# Patient Record
Sex: MALE | Race: WHITE | Employment: OTHER | URBAN - METROPOLITAN AREA
[De-identification: names, ages, dates, MRNs, and addresses within clinical notes are randomized per-mention and may not be internally consistent; named-entity substitution may affect disease eponyms.]

---

## 2017-01-04 ENCOUNTER — OP HISTORICAL/CONVERTED ENCOUNTER (OUTPATIENT)
Dept: OTHER | Age: 82
End: 2017-01-04

## 2017-02-15 ENCOUNTER — OP HISTORICAL/CONVERTED ENCOUNTER (OUTPATIENT)
Dept: OTHER | Age: 82
End: 2017-02-15

## 2017-04-12 ENCOUNTER — OP HISTORICAL/CONVERTED ENCOUNTER (OUTPATIENT)
Dept: OTHER | Age: 82
End: 2017-04-12

## 2017-05-17 ENCOUNTER — OP HISTORICAL/CONVERTED ENCOUNTER (OUTPATIENT)
Dept: OTHER | Age: 82
End: 2017-05-17

## 2017-05-24 ENCOUNTER — OP HISTORICAL/CONVERTED ENCOUNTER (OUTPATIENT)
Dept: OTHER | Age: 82
End: 2017-05-24

## 2017-08-23 ENCOUNTER — OP HISTORICAL/CONVERTED ENCOUNTER (OUTPATIENT)
Dept: OTHER | Age: 82
End: 2017-08-23

## 2017-08-29 ENCOUNTER — OP HISTORICAL/CONVERTED ENCOUNTER (OUTPATIENT)
Dept: OTHER | Age: 82
End: 2017-08-29

## 2017-11-08 ENCOUNTER — OP HISTORICAL/CONVERTED ENCOUNTER (OUTPATIENT)
Dept: OTHER | Age: 82
End: 2017-11-08

## 2018-02-12 ENCOUNTER — OP HISTORICAL/CONVERTED ENCOUNTER (OUTPATIENT)
Dept: OTHER | Age: 83
End: 2018-02-12

## 2018-03-02 ENCOUNTER — OP HISTORICAL/CONVERTED ENCOUNTER (OUTPATIENT)
Dept: OTHER | Age: 83
End: 2018-03-02

## 2018-03-07 ENCOUNTER — OP HISTORICAL/CONVERTED ENCOUNTER (OUTPATIENT)
Dept: OTHER | Age: 83
End: 2018-03-07

## 2018-04-06 ENCOUNTER — OP HISTORICAL/CONVERTED ENCOUNTER (OUTPATIENT)
Dept: OTHER | Age: 83
End: 2018-04-06

## 2018-04-24 ENCOUNTER — OP HISTORICAL/CONVERTED ENCOUNTER (OUTPATIENT)
Dept: OTHER | Age: 83
End: 2018-04-24

## 2018-05-17 ENCOUNTER — OP HISTORICAL/CONVERTED ENCOUNTER (OUTPATIENT)
Dept: OTHER | Age: 83
End: 2018-05-17

## 2018-05-21 ENCOUNTER — OP HISTORICAL/CONVERTED ENCOUNTER (OUTPATIENT)
Dept: OTHER | Age: 83
End: 2018-05-21

## 2018-06-28 ENCOUNTER — OP HISTORICAL/CONVERTED ENCOUNTER (OUTPATIENT)
Dept: OTHER | Age: 83
End: 2018-06-28

## 2018-07-02 ENCOUNTER — OP HISTORICAL/CONVERTED ENCOUNTER (OUTPATIENT)
Dept: OTHER | Age: 83
End: 2018-07-02

## 2018-08-09 ENCOUNTER — OP HISTORICAL/CONVERTED ENCOUNTER (OUTPATIENT)
Dept: OTHER | Age: 83
End: 2018-08-09

## 2018-08-16 ENCOUNTER — OP HISTORICAL/CONVERTED ENCOUNTER (OUTPATIENT)
Dept: OTHER | Age: 83
End: 2018-08-16

## 2018-09-04 ENCOUNTER — OP HISTORICAL/CONVERTED ENCOUNTER (OUTPATIENT)
Dept: OTHER | Age: 83
End: 2018-09-04

## 2018-10-01 ENCOUNTER — OP HISTORICAL/CONVERTED ENCOUNTER (OUTPATIENT)
Dept: OTHER | Age: 83
End: 2018-10-01

## 2018-10-09 ENCOUNTER — OP HISTORICAL/CONVERTED ENCOUNTER (OUTPATIENT)
Dept: OTHER | Age: 83
End: 2018-10-09

## 2018-10-15 ENCOUNTER — OP HISTORICAL/CONVERTED ENCOUNTER (OUTPATIENT)
Dept: OTHER | Age: 83
End: 2018-10-15

## 2018-11-01 ENCOUNTER — OP HISTORICAL/CONVERTED ENCOUNTER (OUTPATIENT)
Dept: OTHER | Age: 83
End: 2018-11-01

## 2018-11-14 ENCOUNTER — OP HISTORICAL/CONVERTED ENCOUNTER (OUTPATIENT)
Dept: OTHER | Age: 83
End: 2018-11-14

## 2018-11-20 ENCOUNTER — OP HISTORICAL/CONVERTED ENCOUNTER (OUTPATIENT)
Dept: OTHER | Age: 83
End: 2018-11-20

## 2018-11-21 ENCOUNTER — OP HISTORICAL/CONVERTED ENCOUNTER (OUTPATIENT)
Dept: OTHER | Age: 83
End: 2018-11-21

## 2020-01-03 ENCOUNTER — OP HISTORICAL/CONVERTED ENCOUNTER (OUTPATIENT)
Dept: OTHER | Age: 85
End: 2020-01-03

## 2020-01-03 LAB — CREATININE, EXTERNAL: 1.35

## 2020-01-06 ENCOUNTER — OP HISTORICAL/CONVERTED ENCOUNTER (OUTPATIENT)
Dept: OTHER | Age: 85
End: 2020-01-06

## 2020-06-12 LAB — PSA, EXTERNAL: 0.1

## 2020-07-15 VITALS
HEIGHT: 67 IN | DIASTOLIC BLOOD PRESSURE: 77 MMHG | TEMPERATURE: 98.2 F | BODY MASS INDEX: 30.92 KG/M2 | WEIGHT: 197 LBS | SYSTOLIC BLOOD PRESSURE: 128 MMHG

## 2020-07-15 PROBLEM — R82.998 LEUKOCYTES IN URINE: Status: ACTIVE | Noted: 2020-07-15

## 2020-07-15 PROBLEM — N41.1 CHRONIC PROSTATITIS: Status: ACTIVE | Noted: 2020-07-15

## 2020-07-15 PROBLEM — R33.9 INCOMPLETE EMPTYING OF BLADDER: Status: ACTIVE | Noted: 2020-07-15

## 2020-07-15 PROBLEM — N13.8 ENLARGED PROSTATE WITH URINARY OBSTRUCTION: Status: ACTIVE | Noted: 2020-07-15

## 2020-07-15 PROBLEM — N40.1 ENLARGED PROSTATE WITH URINARY OBSTRUCTION: Status: ACTIVE | Noted: 2020-07-15

## 2020-07-15 PROBLEM — R35.1 NOCTURIA: Status: ACTIVE | Noted: 2020-07-15

## 2020-07-15 PROBLEM — N32.0 BLADDER NECK OBSTRUCTION: Status: ACTIVE | Noted: 2020-07-15

## 2020-07-15 PROBLEM — R31.0 FRANK HEMATURIA: Status: ACTIVE | Noted: 2020-07-15

## 2020-07-15 PROBLEM — N45.1 CHRONIC EPIDIDYMITIS: Status: ACTIVE | Noted: 2020-07-15

## 2020-07-15 PROBLEM — R35.0 INCREASED FREQUENCY OF URINATION: Status: ACTIVE | Noted: 2020-07-15

## 2020-07-15 PROBLEM — R39.16 MUST STRAIN TO PASS URINE: Status: ACTIVE | Noted: 2020-07-15

## 2020-07-15 PROBLEM — R31.29 MICROSCOPIC HEMATURIA: Status: ACTIVE | Noted: 2020-07-15

## 2020-07-15 RX ORDER — CANDESARTAN 8 MG/1
TABLET ORAL DAILY
COMMUNITY
End: 2021-12-11

## 2020-07-15 RX ORDER — FUROSEMIDE 40 MG/1
TABLET ORAL DAILY
COMMUNITY

## 2020-07-15 RX ORDER — UREA 10 %
LOTION (ML) TOPICAL
COMMUNITY

## 2020-07-15 RX ORDER — TAMSULOSIN HYDROCHLORIDE 0.4 MG/1
0.4 CAPSULE ORAL DAILY
COMMUNITY

## 2020-07-15 RX ORDER — TRAZODONE HYDROCHLORIDE 50 MG/1
TABLET ORAL
COMMUNITY

## 2020-07-15 RX ORDER — LEUPROLIDE ACETATE 45 MG
45 KIT INTRAMUSCULAR ONCE
COMMUNITY

## 2020-07-15 RX ORDER — PRAVASTATIN SODIUM 40 MG/1
40 TABLET ORAL
COMMUNITY

## 2020-07-15 RX ORDER — CARVEDILOL 6.25 MG/1
TABLET ORAL 2 TIMES DAILY WITH MEALS
COMMUNITY
End: 2021-12-11

## 2020-07-15 RX ORDER — DOCUSATE SODIUM 100 MG/1
100 CAPSULE, LIQUID FILLED ORAL 2 TIMES DAILY
COMMUNITY

## 2020-07-15 RX ORDER — MELATONIN
DAILY
COMMUNITY

## 2020-07-15 RX ORDER — LISINOPRIL 5 MG/1
TABLET ORAL DAILY
COMMUNITY

## 2020-07-15 RX ORDER — TRAMADOL HYDROCHLORIDE 50 MG/1
50 TABLET ORAL
COMMUNITY

## 2020-07-15 RX ORDER — ASPIRIN 81 MG/1
TABLET ORAL DAILY
COMMUNITY

## 2020-07-15 RX ORDER — BISMUTH SUBSALICYLATE 262 MG
1 TABLET,CHEWABLE ORAL DAILY
COMMUNITY

## 2020-07-15 RX ORDER — ALFUZOSIN HYDROCHLORIDE 10 MG/1
TABLET, EXTENDED RELEASE ORAL DAILY
COMMUNITY
End: 2021-12-11

## 2020-07-15 RX ORDER — DUTASTERIDE 0.5 MG/1
0.5 CAPSULE, LIQUID FILLED ORAL DAILY
COMMUNITY

## 2020-07-15 RX ORDER — FINASTERIDE 5 MG/1
5 TABLET, FILM COATED ORAL DAILY
COMMUNITY

## 2020-07-15 RX ORDER — EPLERENONE 25 MG/1
TABLET, FILM COATED ORAL DAILY
COMMUNITY
End: 2021-12-11

## 2020-07-21 ENCOUNTER — OFFICE VISIT (OUTPATIENT)
Dept: UROLOGY | Age: 85
End: 2020-07-21
Payer: MEDICARE

## 2020-07-21 DIAGNOSIS — C61 MALIGNANT NEOPLASM OF PROSTATE (HCC): Primary | ICD-10-CM

## 2020-07-21 PROCEDURE — 96402 CHEMO HORMON ANTINEOPL SQ/IM: CPT

## 2020-07-21 RX ORDER — MEGESTROL ACETATE 40 MG/1
40 TABLET ORAL DAILY
Qty: 60 TAB | Refills: 3 | Status: SHIPPED | OUTPATIENT
Start: 2020-07-21

## 2020-07-22 VITALS
DIASTOLIC BLOOD PRESSURE: 77 MMHG | TEMPERATURE: 98.2 F | SYSTOLIC BLOOD PRESSURE: 128 MMHG | BODY MASS INDEX: 30.92 KG/M2 | WEIGHT: 197 LBS | HEIGHT: 67 IN

## 2020-07-22 PROBLEM — R39.9 LOWER URINARY TRACT SYMPTOMS: Status: ACTIVE | Noted: 2020-07-22

## 2020-07-22 PROBLEM — R97.20 RAISED PROSTATE SPECIFIC ANTIGEN: Status: ACTIVE | Noted: 2020-07-22

## 2020-07-22 PROBLEM — N13.9 URINARY (TRACT) OBSTRUCTION: Status: ACTIVE | Noted: 2020-07-22

## 2020-07-22 PROBLEM — R39.198 SLOWING OF URINARY STREAM: Status: ACTIVE | Noted: 2020-07-22

## 2020-07-22 PROBLEM — N45.3 ORCHITIS AND EPIDIDYMITIS: Status: ACTIVE | Noted: 2020-07-22

## 2020-07-22 RX ORDER — ACETAMINOPHEN 500 MG
TABLET ORAL
COMMUNITY

## 2020-07-22 RX ORDER — SENNOSIDES 25 MG/1
TABLET, FILM COATED ORAL
COMMUNITY

## 2020-07-22 RX ORDER — DORZOLAMIDE HCL 20 MG/ML
2 SOLUTION/ DROPS OPHTHALMIC 3 TIMES DAILY
COMMUNITY

## 2020-07-22 RX ORDER — SERTRALINE HYDROCHLORIDE 50 MG/1
TABLET, FILM COATED ORAL DAILY
COMMUNITY

## 2020-09-10 ENCOUNTER — OFFICE VISIT (OUTPATIENT)
Dept: UROLOGY | Age: 85
End: 2020-09-10
Payer: MEDICARE

## 2020-09-10 VITALS
HEIGHT: 68 IN | WEIGHT: 192 LBS | DIASTOLIC BLOOD PRESSURE: 70 MMHG | TEMPERATURE: 97.9 F | SYSTOLIC BLOOD PRESSURE: 112 MMHG | BODY MASS INDEX: 29.1 KG/M2

## 2020-09-10 DIAGNOSIS — C61 MALIGNANT NEOPLASM OF PROSTATE (HCC): Primary | ICD-10-CM

## 2020-09-10 LAB
BILIRUB UR QL STRIP: NEGATIVE
GLUCOSE UR-MCNC: NEGATIVE MG/DL
KETONES P FAST UR STRIP-MCNC: NORMAL MG/DL
PH UR STRIP: 7 [PH] (ref 4.6–8)
PROT UR QL STRIP: NORMAL
SP GR UR STRIP: 1.02 (ref 1–1.03)
UA UROBILINOGEN AMB POC: NORMAL (ref 0.2–1)
URINALYSIS CLARITY POC: CLEAR
URINALYSIS COLOR POC: YELLOW
URINE BLOOD POC: NORMAL
URINE LEUKOCYTES POC: NEGATIVE
URINE NITRITES POC: NEGATIVE

## 2020-09-10 PROCEDURE — 81003 URINALYSIS AUTO W/O SCOPE: CPT | Performed by: UROLOGY

## 2020-09-10 PROCEDURE — 99214 OFFICE O/P EST MOD 30 MIN: CPT | Performed by: UROLOGY

## 2020-09-10 PROCEDURE — G8427 DOCREV CUR MEDS BY ELIG CLIN: HCPCS | Performed by: UROLOGY

## 2020-09-10 RX ORDER — MEGESTROL ACETATE 40 MG/1
40 TABLET ORAL 2 TIMES DAILY
Qty: 180 TAB | Refills: 3 | Status: SHIPPED | OUTPATIENT
Start: 2020-09-10 | End: 2020-12-09

## 2020-09-10 RX ORDER — TAMSULOSIN HYDROCHLORIDE 0.4 MG/1
0.8 CAPSULE ORAL
Qty: 180 CAP | Refills: 3 | Status: SHIPPED | OUTPATIENT
Start: 2020-09-10 | End: 2020-12-09

## 2020-09-10 RX ORDER — ALFUZOSIN HYDROCHLORIDE 10 MG/1
10 TABLET, EXTENDED RELEASE ORAL
Qty: 90 TAB | Refills: 3 | Status: SHIPPED | OUTPATIENT
Start: 2020-09-10 | End: 2020-12-09

## 2020-09-10 NOTE — PROGRESS NOTES
HPI ROS PE NOTE          History of Present Illness   Chief complaint: Carcinoma of the prostate,followup  Rose Mary Marx is a 80 y.o. 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 June ablation which was begun to accompany radiation is continued because of high risk disease despite the fact that was only 1+ biopsy; patient has been complicated by radiation cystitis causing gross hematuria, patient had cystoscopy and retrograde pyelograms performed as recently as January of this year. Previous cystoscopies in 2018 showed the same findings. Had a recent episode of gross hematuria which is cleared. Patient also has rather marked adverse voiding symptoms which have which has been only partially solved with treatment with multiple medications. Takes both alfuzosin after supper and tamsulosin after breakfast among the alpha blockers, tamsulosin being 0.8 mg. He has been on finasteride and dutasteride as well. Bethanechol chloride has been prescribed but was not of great benefit. 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 Patient has a high degree of anxiety about his prostate cancer.    Past Medical History:   Diagnosis Date    A-fib ([address]     Aneurysm ([address]     Burning with urination     Cysto & BRP    CAD (coronary artery disease)     A fib    Cancer (HCC)     prostate    Depression     Dizziness     Dizziness     GERD (gastroesophageal reflux disease)     Headache     Hypercholesterolemia     Hypertension     Hypotension     Pulmonary disease     Pulmonary disease     Sleep apnea     Thyroid disease       Past Surgical History:   Procedure Laterality Date    CARDIAC SURG PROCEDURE UNLIST      Catheterization, Defibrillator    HX AFIB ABLATION      HX CHOLECYSTECTOMY      HX COLONOSCOPY      2018    HX UROLOGICAL       Family History   Problem Relation Age of Onset    Heart Disease Father     Heart Attack Father     Prostate Cancer Brother     Colon Cancer Brother     Cancer Brother       Social History     Tobacco Use    Smoking status: Former Smoker    Smokeless tobacco: Never Used   Substance Use Topics    Alcohol use: Never     Frequency: Never       Prior to Admission medications    Medication Sig Start Date End Date Taking? Authorizing Provider   alfuzosin SR (UROXATRAL) 10 mg SR tablet Take 1 Tab by mouth daily (after dinner) for 90 days. 9/10/20 12/9/20 Yes Kerwin Bowers MD   megestroL (MEGACE) 40 mg tablet Take 1 Tab by mouth two (2) times a day for 90 days. 9/10/20 12/9/20 Yes Kerwin Bowers MD   folic acid/multivit-min/lutein (CENTRUM SILVER PO) Take  by mouth. Yes Provider, Historical   dorzolamide (TRUSOPT) 2 % ophthalmic solution Administer 2 Drops to both eyes three (3) times daily. Yes Provider, Historical   lidocaine 5 % topical cream Apply  to affected area two (2) times daily as needed. Yes Provider, Historical   sertraline (ZOLOFT) 50 mg tablet Take  by mouth daily. Yes Provider, Historical   acetaminophen (TYLENOL) 500 mg tablet Take  by mouth every six (6) hours as needed for Pain. Yes Provider, Historical   megestroL (MEGACE) 40 mg tablet Take 1 Tab by mouth daily. 7/21/20  Yes Kerwin Bowers MD   alfuzosin SR (UROXATRAL) 10 mg SR tablet Take  by mouth daily. Yes Provider, Historical   candesartan (ATACAND) 8 mg tablet Take  by mouth daily. Yes Provider, Historical   carvediloL (COREG) 6.25 mg tablet Take  by mouth two (2) times daily (with meals). Yes Provider, Historical   multivitamin (ONE A DAY) tablet Take 1 Tab by mouth daily. Yes Provider, Historical   docusate sodium (COLACE) 100 mg capsule Take 100 mg by mouth two (2) times a day. Yes Provider, Historical   dutasteride (AVODART) 0.5 mg capsule Take 0.5 mg by mouth daily.    Yes Provider, Historical apixaban (Eliquis) 5 mg tablet Take 5 mg by mouth two (2) times a day. Yes Provider, Historical   eplerenone (INSPRA) 25 mg tablet Take  by mouth daily. Yes Provider, Historical   finasteride (PROSCAR) 5 mg tablet Take 5 mg by mouth daily. Yes Provider, Historical   furosemide (LASIX) 40 mg tablet Take  by mouth daily. Yes Provider, Historical   lisinopriL (PRINIVIL, ZESTRIL) 5 mg tablet Take  by mouth daily. Yes Provider, Historical   leuprolide depot (Lupron Depot, 6 Month,) 45 mg injection 45 mg by IntraMUSCular route once. Yes Provider, Historical   melatonin 1 mg tablet Take  by mouth. Yes Provider, Historical   pravastatin (PRAVACHOL) 40 mg tablet Take 40 mg by mouth nightly. Yes Provider, Historical   tamsulosin (FLOMAX) 0.4 mg capsule Take 0.4 mg by mouth daily. Yes Provider, Historical   traMADoL (ULTRAM) 50 mg tablet Take 50 mg by mouth every six (6) hours as needed for Pain. Yes Provider, Historical   traZODone (DESYREL) 50 mg tablet Take  by mouth nightly. Yes Provider, Historical   cholecalciferol (Vitamin D3) (1000 Units /25 mcg) tablet Take  by mouth daily. Yes Provider, Historical   aspirin delayed-release 81 mg tablet Take  by mouth daily. Provider, Historical     No Known Allergies     Review of Systems:  Constitutional: negative  Respiratory: negative  Cardiovascular: negative  Gastrointestinal: positive for constipation  Genitourinary:positive for frequency, nocturia, decreased stream and Urgency incomplete emptying straining to urinate  Musculoskeletal:positive for arthralgias, stiff joints and back pain  Neurological: negative  Behavioral/Psychiatric: positive for anxiety     Physical Exam     Physical Exam:   Visit Vitals  /70 (BP 1 Location: Left arm, BP Patient Position: Sitting)   Temp 97.9 °F (36.6 °C) (Oral)   Ht 5' 8\" (1.727 m)   Wt 192 lb (87.1 kg)   BMI 29.19 kg/m²     General:  Alert, cooperative, no distress, appears stated age.    Head: Normocephalic, without obvious abnormality, atraumatic. Eyes:     Ears:     Nose: Nares normal. Septum midline. Mucosa normal. No drainage or sinus tenderness. Throat: Lips, mucosa, and tongue normal. Teeth and gums normal.   Neck: Supple, symmetrical, trachea midline, no adenopathy, thyroid: no enlargement/tenderness/nodules, no carotid bruit and no JVD. Back:   Symmetric, no curvature. ROM normal. No CVA tenderness. Lungs:   Clear to auscultation bilaterally. Chest wall:  No tenderness or deformity. Heart:  Regular rate and rhythm, S1, S2 normal, no murmur, click, rub or gallop. Abdomen:   Soft, non-tender. Bowel sounds normal. No masses,  No organomegaly. Genitalia:  Normal male without lesion, discharge or tenderness. Rectal:  Normal tone, 28 gm prostate, no masses or tenderness     Extremities: Extremities normal, atraumatic, no cyanosis or edema. Pulses: 2+ and symmetric all extremities. Skin: Skin color, texture, turgor normal. No rashes or lesions   Lymph nodes: Cervical, supraclavicular, and axillary nodes normal.   Neurologic: Normal strength, sensation and reflexes throughout. Data Review:   Recent Results (from the past 50 hour(s))   AMB POC URINALYSIS DIP STICK AUTO W/O MICRO    Collection Time: 09/10/20  2:26 PM   Result Value Ref Range    Color (UA POC) Yellow     Clarity (UA POC) Clear     Glucose (UA POC) Negative Negative    Bilirubin (UA POC) Negative Negative    Ketones (UA POC) Trace Negative    Specific gravity (UA POC) 1.020 1.001 - 1.035    Blood (UA POC) 3+ Negative    pH (UA POC) 7.0 4.6 - 8.0    Protein (UA POC) Trace Negative    Urobilinogen (UA POC) 2 mg/dL 0.2 - 1    Nitrites (UA POC) Negative Negative    Leukocyte esterase (UA POC) Negative Negative     No results for input(s): 48 in the last 72 hours.       Assessment and Plan:   High risk carcinoma of the prostate, [place] 8, perform PSA test in 1 month which is 3 months after last leuprolide injection in July of this year; also test testosterone level; follow-up visit with repeat PSA and testosterone in January 2021 to assess for need for another leuprolide injection to keep testosterone low; patient advised that he can discontinue finasteride and dutasteride since leuprolide accomplishes the same end by lowering testosterone  Hot flash menopausal flushing reaction to lowered testosterone from leuprolide treated with refill of megestrol  40 mg twice daily; alfuzosin also refilled along with tamsulosin. Continue to follow for gross hematuria secondary to radiation cystitis  Extended visit, 25 minutes face to face, review of records, review of labs, refill of medications, physical examination and extensive counseling regarding repeat hematuria radiation cystitis and current stability of prostate cancer with treatments available for significant bladder neck obstruction, an ongoing problem for this patient. Mr. Lucas Wisdom has a reminder for a \"due or due soon\" health maintenance. I have asked that he contact his primary care provider for follow-up on this health maintenance. Elvis Douglas M.D.  9/10/2020

## 2020-09-10 NOTE — LETTER
9/10/20 Patient: Zaida Lizarraga YOB: 1930 Date of Visit: 9/10/2020 Gene Gil MD 
[address] [place] [address],[address] [id number] VIA Facsimile: [phone] Dear Gene Gil MD, Thank you for referring Mr. Zaida Lizarraga to [address] [place] [address] [place] for evaluation. My notes for this consultation are attached. If you have questions, please do not hesitate to call me. I look forward to following your patient along with you. Sincerely, Sheri Dillon MD

## 2020-09-18 DIAGNOSIS — C61 MALIGNANT NEOPLASM OF PROSTATE (HCC): Primary | ICD-10-CM

## 2020-09-18 DIAGNOSIS — N32.0 BLADDER NECK OBSTRUCTION: ICD-10-CM

## 2020-09-18 DIAGNOSIS — N40.1 ENLARGED PROSTATE WITH URINARY OBSTRUCTION: ICD-10-CM

## 2020-09-18 DIAGNOSIS — N13.8 ENLARGED PROSTATE WITH URINARY OBSTRUCTION: ICD-10-CM

## 2020-09-18 RX ORDER — TAMSULOSIN HYDROCHLORIDE 0.4 MG/1
0.4 CAPSULE ORAL DAILY
Qty: 90 CAP | Refills: 3 | Status: CANCELLED | OUTPATIENT
Start: 2020-09-18

## 2020-09-18 RX ORDER — ALFUZOSIN HYDROCHLORIDE 10 MG/1
10 TABLET, EXTENDED RELEASE ORAL DAILY
Qty: 90 TAB | Refills: 3 | Status: CANCELLED | OUTPATIENT
Start: 2020-09-18

## 2020-09-18 RX ORDER — MEGESTROL ACETATE 40 MG/1
40 TABLET ORAL DAILY
Qty: 90 TAB | Refills: 3 | Status: CANCELLED | OUTPATIENT
Start: 2020-09-18

## 2020-09-21 ENCOUNTER — TELEPHONE (OUTPATIENT)
Dept: UROLOGY | Age: 85
End: 2020-09-21

## 2020-09-21 DIAGNOSIS — N40.1 ENLARGED PROSTATE WITH URINARY OBSTRUCTION: ICD-10-CM

## 2020-09-21 DIAGNOSIS — C61 MALIGNANT NEOPLASM OF PROSTATE (HCC): Primary | ICD-10-CM

## 2020-09-21 DIAGNOSIS — N13.8 ENLARGED PROSTATE WITH URINARY OBSTRUCTION: ICD-10-CM

## 2020-09-23 DIAGNOSIS — N13.8 ENLARGED PROSTATE WITH URINARY OBSTRUCTION: Primary | ICD-10-CM

## 2020-09-23 DIAGNOSIS — N40.1 ENLARGED PROSTATE WITH URINARY OBSTRUCTION: Primary | ICD-10-CM

## 2020-09-23 DIAGNOSIS — C61 MALIGNANT NEOPLASM OF PROSTATE (HCC): ICD-10-CM

## 2020-09-23 RX ORDER — MEGESTROL ACETATE 40 MG/1
40 TABLET ORAL 2 TIMES DAILY
Qty: 180 TAB | Refills: 3 | Status: CANCELLED | OUTPATIENT
Start: 2020-09-23 | End: 2020-12-22

## 2020-09-23 RX ORDER — TAMSULOSIN HYDROCHLORIDE 0.4 MG/1
0.8 CAPSULE ORAL
Qty: 180 CAP | Refills: 3 | Status: CANCELLED | OUTPATIENT
Start: 2020-09-23 | End: 2020-12-22

## 2020-09-23 RX ORDER — ALFUZOSIN HYDROCHLORIDE 10 MG/1
10 TABLET, EXTENDED RELEASE ORAL
Qty: 90 TAB | Refills: 3 | Status: CANCELLED | OUTPATIENT
Start: 2020-09-23 | End: 2020-12-22

## 2020-09-24 RX ORDER — ALFUZOSIN HYDROCHLORIDE 10 MG/1
10 TABLET, EXTENDED RELEASE ORAL DAILY
Qty: 90 TAB | Refills: 3 | Status: CANCELLED | OUTPATIENT
Start: 2020-09-24

## 2020-09-24 RX ORDER — TAMSULOSIN HYDROCHLORIDE 0.4 MG/1
0.8 CAPSULE ORAL
Qty: 180 CAP | Refills: 3 | Status: CANCELLED | OUTPATIENT
Start: 2020-09-24

## 2020-09-24 RX ORDER — MEGESTROL ACETATE 40 MG/1
40 TABLET ORAL 2 TIMES DAILY
Qty: 180 TAB | Refills: 3 | Status: CANCELLED | OUTPATIENT
Start: 2020-09-24

## 2020-10-06 LAB
PSA SERPL-MCNC: <0.1 NG/ML (ref 0–4)
TESTOST FREE SERPL-MCNC: 1.8 PG/ML (ref 6.6–18.1)
TESTOST SERPL-MCNC: 7 NG/DL (ref 264–916)

## 2020-10-10 DIAGNOSIS — C61 MALIGNANT NEOPLASM OF PROSTATE (HCC): ICD-10-CM

## 2021-01-10 DIAGNOSIS — C61 MALIGNANT NEOPLASM OF PROSTATE (HCC): ICD-10-CM

## 2021-01-25 ENCOUNTER — TELEPHONE (OUTPATIENT)
Dept: UROLOGY | Age: 86
End: 2021-01-25

## 2021-01-25 DIAGNOSIS — N41.1 CHRONIC PROSTATITIS: ICD-10-CM

## 2021-01-25 DIAGNOSIS — N13.8 ENLARGED PROSTATE WITH URINARY OBSTRUCTION: Primary | ICD-10-CM

## 2021-01-25 DIAGNOSIS — N40.1 ENLARGED PROSTATE WITH URINARY OBSTRUCTION: Primary | ICD-10-CM

## 2021-01-25 DIAGNOSIS — R97.20 RAISED PROSTATE SPECIFIC ANTIGEN: ICD-10-CM

## 2021-01-25 NOTE — TELEPHONE ENCOUNTER
Spoke with Mr. Antonieta Martinez and he said that he no longer see's Dr. Anastasiia Cavazos anymore and that all his records have been transferred to [facility].

## 2021-06-23 ENCOUNTER — HOSPITAL ENCOUNTER (OUTPATIENT)
Dept: RADIATION THERAPY | Age: 86
Discharge: HOME | End: 2021-06-23

## 2021-12-09 ENCOUNTER — APPOINTMENT (OUTPATIENT)
Dept: GENERAL RADIOLOGY | Age: 86
End: 2021-12-09
Attending: STUDENT IN AN ORGANIZED HEALTH CARE EDUCATION/TRAINING PROGRAM
Payer: MEDICARE

## 2021-12-09 ENCOUNTER — APPOINTMENT (OUTPATIENT)
Dept: CT IMAGING | Age: 86
End: 2021-12-09
Attending: EMERGENCY MEDICINE
Payer: MEDICARE

## 2021-12-09 ENCOUNTER — HOSPITAL ENCOUNTER (OUTPATIENT)
Age: 86
Setting detail: OBSERVATION
Discharge: HOME | End: 2021-12-11
Attending: EMERGENCY MEDICINE | Admitting: INTERNAL MEDICINE
Payer: MEDICARE

## 2021-12-09 DIAGNOSIS — R55 NEAR SYNCOPE: Primary | ICD-10-CM

## 2021-12-09 LAB
ALBUMIN SERPL-MCNC: 3 G/DL (ref 3.5–5)
ALBUMIN/GLOB SERPL: 1.3 {RATIO} (ref 1.1–2.2)
ALP SERPL-CCNC: 55 U/L (ref 45–117)
ALT SERPL-CCNC: 20 U/L (ref 12–78)
ANION GAP SERPL CALC-SCNC: 3 MMOL/L (ref 5–15)
APPEARANCE UR: CLEAR
AST SERPL W P-5'-P-CCNC: 18 U/L (ref 15–37)
BACTERIA URNS QL MICRO: NEGATIVE /HPF
BASOPHILS # BLD: 0 K/UL (ref 0–0.1)
BASOPHILS NFR BLD: 0 % (ref 0–1)
BILIRUB SERPL-MCNC: 0.5 MG/DL (ref 0.2–1)
BILIRUB UR QL: NEGATIVE
BUN SERPL-MCNC: 21 MG/DL (ref 6–20)
BUN/CREAT SERPL: 16 (ref 12–20)
CA-I BLD-MCNC: 8.2 MG/DL (ref 8.5–10.1)
CHLORIDE SERPL-SCNC: 113 MMOL/L (ref 97–108)
CO2 SERPL-SCNC: 27 MMOL/L (ref 21–32)
COLOR UR: ABNORMAL
CREAT SERPL-MCNC: 1.29 MG/DL (ref 0.7–1.3)
DIFFERENTIAL METHOD BLD: ABNORMAL
EOSINOPHIL # BLD: 0.1 K/UL (ref 0–0.4)
EOSINOPHIL NFR BLD: 1 % (ref 0–7)
ERYTHROCYTE [DISTWIDTH] IN BLOOD BY AUTOMATED COUNT: 12.2 % (ref 11.5–14.5)
GLOBULIN SER CALC-MCNC: 2.4 G/DL (ref 2–4)
GLUCOSE SERPL-MCNC: 146 MG/DL (ref 65–100)
GLUCOSE UR STRIP.AUTO-MCNC: NEGATIVE MG/DL
HCT VFR BLD AUTO: 35.1 % (ref 36.6–50.3)
HGB BLD-MCNC: 10.8 G/DL (ref 12.1–17)
HGB UR QL STRIP: ABNORMAL
HYALINE CASTS URNS QL MICRO: ABNORMAL /LPF (ref 0–5)
IMM GRANULOCYTES # BLD AUTO: 0 K/UL (ref 0–0.04)
IMM GRANULOCYTES NFR BLD AUTO: 1 % (ref 0–0.5)
KETONES UR QL STRIP.AUTO: NEGATIVE MG/DL
LEUKOCYTE ESTERASE UR QL STRIP.AUTO: NEGATIVE
LYMPHOCYTES # BLD: 0.8 K/UL (ref 0.8–3.5)
LYMPHOCYTES NFR BLD: 9 % (ref 12–49)
MCH RBC QN AUTO: 32.5 PG (ref 26–34)
MCHC RBC AUTO-ENTMCNC: 30.8 G/DL (ref 30–36.5)
MCV RBC AUTO: 105.7 FL (ref 80–99)
MONOCYTES # BLD: 0.6 K/UL (ref 0–1)
MONOCYTES NFR BLD: 8 % (ref 5–13)
MUCOUS THREADS URNS QL MICRO: ABNORMAL /LPF
NEUTS SEG # BLD: 6.6 K/UL (ref 1.8–8)
NEUTS SEG NFR BLD: 81 % (ref 32–75)
NITRITE UR QL STRIP.AUTO: NEGATIVE
NRBC # BLD: 0 K/UL (ref 0–0.01)
NRBC BLD-RTO: 0 PER 100 WBC
PH UR STRIP: 5 [PH] (ref 5–8)
PLATELET # BLD AUTO: 149 K/UL (ref 150–400)
PMV BLD AUTO: 11.6 FL (ref 8.9–12.9)
POTASSIUM SERPL-SCNC: 4.5 MMOL/L (ref 3.5–5.1)
PROT SERPL-MCNC: 5.4 G/DL (ref 6.4–8.2)
PROT UR STRIP-MCNC: NEGATIVE MG/DL
RBC # BLD AUTO: 3.32 M/UL (ref 4.1–5.7)
RBC #/AREA URNS HPF: ABNORMAL /HPF (ref 0–5)
SODIUM SERPL-SCNC: 143 MMOL/L (ref 136–145)
SP GR UR REFRACTOMETRY: 1.01 (ref 1–1.03)
TROPONIN-HIGH SENSITIVITY: 15 NG/L (ref 0–76)
UROBILINOGEN UR QL STRIP.AUTO: 0.1 EU/DL (ref 0.1–1)
WBC # BLD AUTO: 8.2 K/UL (ref 4.1–11.1)
WBC URNS QL MICRO: ABNORMAL /HPF (ref 0–4)

## 2021-12-09 PROCEDURE — 93005 ELECTROCARDIOGRAM TRACING: CPT

## 2021-12-09 PROCEDURE — 80053 COMPREHEN METABOLIC PANEL: CPT

## 2021-12-09 PROCEDURE — 74011000636 HC RX REV CODE- 636: Performed by: EMERGENCY MEDICINE

## 2021-12-09 PROCEDURE — 71045 X-RAY EXAM CHEST 1 VIEW: CPT

## 2021-12-09 PROCEDURE — 70496 CT ANGIOGRAPHY HEAD: CPT

## 2021-12-09 PROCEDURE — 99285 EMERGENCY DEPT VISIT HI MDM: CPT

## 2021-12-09 PROCEDURE — 36415 COLL VENOUS BLD VENIPUNCTURE: CPT

## 2021-12-09 PROCEDURE — 81001 URINALYSIS AUTO W/SCOPE: CPT

## 2021-12-09 PROCEDURE — 84484 ASSAY OF TROPONIN QUANT: CPT

## 2021-12-09 PROCEDURE — 85025 COMPLETE CBC W/AUTO DIFF WBC: CPT

## 2021-12-09 RX ADMIN — IOPAMIDOL 100 ML: 755 INJECTION, SOLUTION INTRAVENOUS at 23:01

## 2021-12-09 NOTE — Clinical Note
Patient Class[de-identified] OBSERVATION [104]   Type of Bed: Remote Telemetry [29]   Cardiac Monitoring Required?: Yes   Reason for Observation: Presyncope   Admitting Diagnosis: Postural dizziness with presyncope [0001122]   Admitting Physician: Tiffanie Melendez   Attending Physician: Tiffanie Melendez

## 2021-12-10 PROBLEM — R42 POSTURAL DIZZINESS WITH PRESYNCOPE: Status: ACTIVE | Noted: 2021-12-10

## 2021-12-10 PROBLEM — R55 POSTURAL DIZZINESS WITH PRESYNCOPE: Status: ACTIVE | Noted: 2021-12-10

## 2021-12-10 LAB
ANION GAP SERPL CALC-SCNC: 4 MMOL/L (ref 5–15)
ATRIAL RATE: 86 BPM
BUN SERPL-MCNC: 20 MG/DL (ref 6–20)
BUN/CREAT SERPL: 17 (ref 12–20)
CA-I BLD-MCNC: 8.5 MG/DL (ref 8.5–10.1)
CALCULATED R AXIS, ECG10: 89 DEGREES
CALCULATED T AXIS, ECG11: 0 DEGREES
CHLORIDE SERPL-SCNC: 117 MMOL/L (ref 97–108)
CO2 SERPL-SCNC: 26 MMOL/L (ref 21–32)
CREAT SERPL-MCNC: 1.17 MG/DL (ref 0.7–1.3)
DIAGNOSIS, 93000: NORMAL
ERYTHROCYTE [DISTWIDTH] IN BLOOD BY AUTOMATED COUNT: 12.3 % (ref 11.5–14.5)
FERRITIN SERPL-MCNC: 36 NG/ML (ref 26–388)
FOLATE SERPL-MCNC: 14.1 NG/ML (ref 5–21)
GLUCOSE SERPL-MCNC: 115 MG/DL (ref 65–100)
HCT VFR BLD AUTO: 33.1 % (ref 36.6–50.3)
HGB BLD-MCNC: 10.2 G/DL (ref 12.1–17)
IRON SATN MFR SERPL: 20 % (ref 20–50)
IRON SERPL-MCNC: 47 UG/DL (ref 35–150)
MCH RBC QN AUTO: 32.2 PG (ref 26–34)
MCHC RBC AUTO-ENTMCNC: 30.8 G/DL (ref 30–36.5)
MCV RBC AUTO: 104.4 FL (ref 80–99)
NRBC # BLD: 0 K/UL (ref 0–0.01)
NRBC BLD-RTO: 0 PER 100 WBC
PLATELET # BLD AUTO: 139 K/UL (ref 150–400)
PMV BLD AUTO: 11.7 FL (ref 8.9–12.9)
POTASSIUM SERPL-SCNC: 4.3 MMOL/L (ref 3.5–5.1)
Q-T INTERVAL, ECG07: 388 MS
QRS DURATION, ECG06: 90 MS
QTC CALCULATION (BEZET), ECG08: 412 MS
RBC # BLD AUTO: 3.17 M/UL (ref 4.1–5.7)
RETICS # AUTO: 0.05 M/UL (ref 0.03–0.1)
RETICS/RBC NFR AUTO: 1.6 % (ref 0.7–2.1)
SODIUM SERPL-SCNC: 147 MMOL/L (ref 136–145)
TIBC SERPL-MCNC: 233 UG/DL (ref 250–450)
VENTRICULAR RATE, ECG03: 68 BPM
VIT B12 SERPL-MCNC: 159 PG/ML (ref 193–986)
WBC # BLD AUTO: 7.6 K/UL (ref 4.1–11.1)

## 2021-12-10 PROCEDURE — 80048 BASIC METABOLIC PNL TOTAL CA: CPT

## 2021-12-10 PROCEDURE — G0378 HOSPITAL OBSERVATION PER HR: HCPCS

## 2021-12-10 PROCEDURE — 82746 ASSAY OF FOLIC ACID SERUM: CPT

## 2021-12-10 PROCEDURE — 82728 ASSAY OF FERRITIN: CPT

## 2021-12-10 PROCEDURE — 85045 AUTOMATED RETICULOCYTE COUNT: CPT

## 2021-12-10 PROCEDURE — 82607 VITAMIN B-12: CPT

## 2021-12-10 PROCEDURE — 36415 COLL VENOUS BLD VENIPUNCTURE: CPT

## 2021-12-10 PROCEDURE — 85027 COMPLETE CBC AUTOMATED: CPT

## 2021-12-10 PROCEDURE — 83540 ASSAY OF IRON: CPT

## 2021-12-10 PROCEDURE — 74011250636 HC RX REV CODE- 250/636: Performed by: INTERNAL MEDICINE

## 2021-12-10 PROCEDURE — 74011250637 HC RX REV CODE- 250/637: Performed by: INTERNAL MEDICINE

## 2021-12-10 RX ORDER — LISINOPRIL 10 MG/1
5 TABLET ORAL DAILY
Status: DISCONTINUED | OUTPATIENT
Start: 2021-12-10 | End: 2021-12-10

## 2021-12-10 RX ORDER — ACETAMINOPHEN 650 MG/1
650 SUPPOSITORY RECTAL
Status: DISCONTINUED | OUTPATIENT
Start: 2021-12-10 | End: 2021-12-11 | Stop reason: HOSPADM

## 2021-12-10 RX ORDER — TRAZODONE HYDROCHLORIDE 50 MG/1
50 TABLET ORAL
Status: DISCONTINUED | OUTPATIENT
Start: 2021-12-10 | End: 2021-12-11 | Stop reason: HOSPADM

## 2021-12-10 RX ORDER — ONDANSETRON 4 MG/1
4 TABLET, ORALLY DISINTEGRATING ORAL
Status: DISCONTINUED | OUTPATIENT
Start: 2021-12-10 | End: 2021-12-11 | Stop reason: HOSPADM

## 2021-12-10 RX ORDER — SERTRALINE HYDROCHLORIDE 50 MG/1
100 TABLET, FILM COATED ORAL EVERY EVENING
Status: DISCONTINUED | OUTPATIENT
Start: 2021-12-10 | End: 2021-12-11 | Stop reason: HOSPADM

## 2021-12-10 RX ORDER — DOCUSATE SODIUM 100 MG/1
100 CAPSULE, LIQUID FILLED ORAL 2 TIMES DAILY
Status: DISCONTINUED | OUTPATIENT
Start: 2021-12-10 | End: 2021-12-11 | Stop reason: HOSPADM

## 2021-12-10 RX ORDER — ACETAMINOPHEN 325 MG/1
650 TABLET ORAL
Status: DISCONTINUED | OUTPATIENT
Start: 2021-12-10 | End: 2021-12-11 | Stop reason: HOSPADM

## 2021-12-10 RX ORDER — SODIUM CHLORIDE 0.9 % (FLUSH) 0.9 %
5-40 SYRINGE (ML) INJECTION EVERY 8 HOURS
Status: DISCONTINUED | OUTPATIENT
Start: 2021-12-10 | End: 2021-12-11 | Stop reason: HOSPADM

## 2021-12-10 RX ORDER — POLYETHYLENE GLYCOL 3350 17 G/17G
17 POWDER, FOR SOLUTION ORAL DAILY PRN
Status: DISCONTINUED | OUTPATIENT
Start: 2021-12-10 | End: 2021-12-11 | Stop reason: HOSPADM

## 2021-12-10 RX ORDER — SODIUM CHLORIDE 9 MG/ML
50 INJECTION, SOLUTION INTRAVENOUS CONTINUOUS
Status: DISCONTINUED | OUTPATIENT
Start: 2021-12-10 | End: 2021-12-10

## 2021-12-10 RX ORDER — AMIODARONE HYDROCHLORIDE 200 MG/1
200 TABLET ORAL DAILY
Status: DISCONTINUED | OUTPATIENT
Start: 2021-12-10 | End: 2021-12-11 | Stop reason: HOSPADM

## 2021-12-10 RX ORDER — EPLERENONE 25 MG/1
25 TABLET, FILM COATED ORAL DAILY
Status: DISCONTINUED | OUTPATIENT
Start: 2021-12-10 | End: 2021-12-11 | Stop reason: HOSPADM

## 2021-12-10 RX ORDER — TAMSULOSIN HYDROCHLORIDE 0.4 MG/1
0.4 CAPSULE ORAL DAILY
Status: DISCONTINUED | OUTPATIENT
Start: 2021-12-10 | End: 2021-12-11 | Stop reason: HOSPADM

## 2021-12-10 RX ORDER — ONDANSETRON 2 MG/ML
4 INJECTION INTRAMUSCULAR; INTRAVENOUS
Status: DISCONTINUED | OUTPATIENT
Start: 2021-12-10 | End: 2021-12-11 | Stop reason: HOSPADM

## 2021-12-10 RX ORDER — DORZOLAMIDE HCL 20 MG/ML
1 SOLUTION/ DROPS OPHTHALMIC 3 TIMES DAILY
Status: DISCONTINUED | OUTPATIENT
Start: 2021-12-10 | End: 2021-12-11 | Stop reason: HOSPADM

## 2021-12-10 RX ORDER — PRAVASTATIN SODIUM 40 MG/1
40 TABLET ORAL
Status: DISCONTINUED | OUTPATIENT
Start: 2021-12-10 | End: 2021-12-11 | Stop reason: HOSPADM

## 2021-12-10 RX ORDER — LANOLIN ALCOHOL/MO/W.PET/CERES
3 CREAM (GRAM) TOPICAL
Status: DISCONTINUED | OUTPATIENT
Start: 2021-12-10 | End: 2021-12-11 | Stop reason: HOSPADM

## 2021-12-10 RX ORDER — FUROSEMIDE 40 MG/1
40 TABLET ORAL DAILY
Status: DISCONTINUED | OUTPATIENT
Start: 2021-12-10 | End: 2021-12-11 | Stop reason: HOSPADM

## 2021-12-10 RX ORDER — TRAMADOL HYDROCHLORIDE 50 MG/1
50 TABLET ORAL
Status: DISCONTINUED | OUTPATIENT
Start: 2021-12-10 | End: 2021-12-11 | Stop reason: HOSPADM

## 2021-12-10 RX ORDER — SODIUM CHLORIDE 0.9 % (FLUSH) 0.9 %
5-40 SYRINGE (ML) INJECTION AS NEEDED
Status: DISCONTINUED | OUTPATIENT
Start: 2021-12-10 | End: 2021-12-11 | Stop reason: HOSPADM

## 2021-12-10 RX ORDER — AMIODARONE HYDROCHLORIDE 200 MG/1
200 TABLET ORAL DAILY
COMMUNITY

## 2021-12-10 RX ORDER — FINASTERIDE 5 MG/1
5 TABLET, FILM COATED ORAL DAILY
Status: DISCONTINUED | OUTPATIENT
Start: 2021-12-10 | End: 2021-12-11 | Stop reason: HOSPADM

## 2021-12-10 RX ADMIN — MELATONIN TAB 3 MG 3 MG: 3 TAB at 21:52

## 2021-12-10 RX ADMIN — AMIODARONE HYDROCHLORIDE 200 MG: 200 TABLET ORAL at 10:50

## 2021-12-10 RX ADMIN — SODIUM CHLORIDE 250 ML: 9 INJECTION, SOLUTION INTRAVENOUS at 03:55

## 2021-12-10 RX ADMIN — SODIUM CHLORIDE 50 ML/HR: 9 INJECTION, SOLUTION INTRAVENOUS at 03:55

## 2021-12-10 RX ADMIN — Medication 10 ML: at 22:03

## 2021-12-10 RX ADMIN — APIXABAN 5 MG: 5 TABLET, FILM COATED ORAL at 21:52

## 2021-12-10 RX ADMIN — TAMSULOSIN HYDROCHLORIDE 0.4 MG: 0.4 CAPSULE ORAL at 10:50

## 2021-12-10 RX ADMIN — PRAVASTATIN SODIUM 40 MG: 40 TABLET ORAL at 21:51

## 2021-12-10 RX ADMIN — DOCUSATE SODIUM 100 MG: 100 CAPSULE ORAL at 10:50

## 2021-12-10 RX ADMIN — TRAZODONE HYDROCHLORIDE 50 MG: 50 TABLET ORAL at 21:51

## 2021-12-10 RX ADMIN — APIXABAN 5 MG: 5 TABLET, FILM COATED ORAL at 10:50

## 2021-12-10 RX ADMIN — Medication 10 ML: at 11:28

## 2021-12-10 RX ADMIN — DOCUSATE SODIUM 100 MG: 100 CAPSULE ORAL at 21:51

## 2021-12-10 NOTE — PROGRESS NOTES
TRANSFER - OUT REPORT:    Verbal report given to Leonardo Newman 44 on [employer]  being transferred to  for routine progression of care       Report consisted of patients Situation, Background, Assessment and   Recommendations(SBAR). Information from the following report(s) SBAR, Kardex, ED Summary, MAR and Med Rec Status was reviewed with the receiving nurse. Lines:   Peripheral IV 12/09/21 Left Antecubital (Active)        Opportunity for questions and clarification was provided.       Patient transported with:   Monitor  Tech

## 2021-12-10 NOTE — ED NOTES
Bedside shift change report given to Heaven Alarcon RN (oncoming nurse) by Jignesh Humphreys (offgoing nurse). Report included the following information SBAR, ED Summary, Intake/Output and MAR.

## 2021-12-10 NOTE — ED PROVIDER NOTES
EMERGENCY DEPARTMENT HISTORY AND PHYSICAL EXAM      Date: 12/9/2021  Patient Name: Silvano Acuna      History of Presenting Illness     Chief Complaint   Patient presents with    Syncope    Hypotension       History Provided By: Patient and Fiance    HPI: Silvano Acuna, 80 y.o. male with a past medical history significant for Afib, CAD, prostate CA, baseline borderline hypotension presents to the ED with cc of near syncope. Pt was driving ~[employer] after eating dinner when he felt lightheaded/dizzy, vision blacking out. Felt pain in his abdomen as well as in his upper neck, had to stop car and lay down. Relative is EMT who helped him. Denies recent illness, F/C/N/V/D, cough, CP, SOB. Denies HA, focal weakness, numbness, tingling. There are no other complaints, changes, or physical findings at this time.     PCP: Joanie Brittle, MD    Current Facility-Administered Medications   Medication Dose Route Frequency Provider Last Rate Last Admin    [Held by provider] eplerenone (INSPRA) tablet 25 mg  25 mg Oral DAILY Lake Mccarty MD        [Held by provider] furosemide (LASIX) tablet 40 mg  40 mg Oral DAILY Lake Mccarty MD        sertraline (ZOLOFT) tablet 100 mg  100 mg Oral QPM Lake Mccarty MD        docusate sodium (COLACE) capsule 100 mg  100 mg Oral BID Lake Mccarty MD   100 mg at 12/11/21 0931    apixaban (ELIQUIS) tablet 5 mg  5 mg Oral BID Abigail Hodges MD   5 mg at 12/11/21 0931    finasteride (PROSCAR) tablet 5 mg  5 mg Oral DAILY Lake Mccarty MD   5 mg at 12/11/21 0931    melatonin tablet 3 mg  3 mg Oral QHS Lake Mccarty MD   3 mg at 12/10/21 2152    pravastatin (PRAVACHOL) tablet 40 mg  40 mg Oral QHS Lake Mccarty MD   40 mg at 12/10/21 2151    tamsulosin (FLOMAX) capsule 0.4 mg  0.4 mg Oral DAILY Lake Mccarty MD   0.4 mg at 12/11/21 0931    traMADoL (ULTRAM) tablet 50 mg  50 mg Oral Q6H PRN Lake Mccarty MD        traZODone (DESYREL) tablet 50 mg  50 mg Oral QHS Abigail Hodges MD 50 mg at 12/10/21 2151    dorzolamide (TRUSOPT) 2 % ophthalmic solution 1 Drop  1 Drop Both Eyes TID Leena Lees MD   1 Drop at 12/11/21 0900    amiodarone (CORDARONE) tablet 200 mg  200 mg Oral DAILY Lake Mccarty MD   200 mg at 12/11/21 0931    sodium chloride (NS) flush 5-40 mL  5-40 mL IntraVENous Q8H Lake Mccarty MD   10 mL at 12/10/21 1128    sodium chloride (NS) flush 5-40 mL  5-40 mL IntraVENous PRN Leena Lees MD   10 mL at 12/11/21 0520    acetaminophen (TYLENOL) tablet 650 mg  650 mg Oral Q6H PRN Leena Lees MD        Or    acetaminophen (TYLENOL) suppository 650 mg  650 mg Rectal Q6H PRN Lake Mccarty MD        polyethylene glycol (MIRALAX) packet 17 g  17 g Oral DAILY PRN Lake Mccarty MD        ondansetron (ZOFRAN ODT) tablet 4 mg  4 mg Oral Q8H PRN Lake Mccarty MD        Or    ondansetron (ZOFRAN) injection 4 mg  4 mg IntraVENous Q6H PRN Lake Mccarty MD         Current Outpatient Medications   Medication Sig Dispense Refill    amiodarone (CORDARONE) 200 mg tablet Take 200 mg by mouth daily.  folic acid/multivit-min/lutein (CENTRUM SILVER PO) Take  by mouth.  dorzolamide (TRUSOPT) 2 % ophthalmic solution Administer 2 Drops to both eyes three (3) times daily.  sertraline (ZOLOFT) 50 mg tablet Take  by mouth daily.  megestroL (MEGACE) 40 mg tablet Take 1 Tab by mouth daily. 60 Tab 3    aspirin delayed-release 81 mg tablet Take  by mouth daily.  multivitamin (ONE A DAY) tablet Take 1 Tab by mouth daily.  docusate sodium (COLACE) 100 mg capsule Take 100 mg by mouth two (2) times a day.  dutasteride (AVODART) 0.5 mg capsule Take 0.5 mg by mouth daily.  apixaban (Eliquis) 5 mg tablet Take 5 mg by mouth two (2) times a day.  finasteride (PROSCAR) 5 mg tablet Take 5 mg by mouth daily.  furosemide (LASIX) 40 mg tablet Take  by mouth daily.  lisinopriL (PRINIVIL, ZESTRIL) 5 mg tablet Take  by mouth daily.       melatonin 1 mg tablet Take  by mouth.  pravastatin (PRAVACHOL) 40 mg tablet Take 40 mg by mouth nightly.  tamsulosin (FLOMAX) 0.4 mg capsule Take 0.4 mg by mouth daily.  traZODone (DESYREL) 50 mg tablet Take  by mouth nightly.  cholecalciferol (Vitamin D3) (1000 Units /25 mcg) tablet Take  by mouth daily.  megestroL (MEGACE) 40 mg tablet Take 1 Tab by mouth two (2) times a day for 90 days. 180 Tab 3    lidocaine 5 % topical cream Apply  to affected area two (2) times daily as needed. (Patient not taking: Reported on 12/10/2021)      acetaminophen (TYLENOL) 500 mg tablet Take  by mouth every six (6) hours as needed for Pain. (Patient not taking: Reported on 12/10/2021)      leuprolide depot (Lupron Depot, 6 Month,) 45 mg injection 45 mg by IntraMUSCular route once. (Patient not taking: Reported on 12/10/2021)      traMADoL (ULTRAM) 50 mg tablet Take 50 mg by mouth every six (6) hours as needed for Pain.  (Patient not taking: Reported on 12/10/2021)         Past History     Past Medical History:  Past Medical History:   Diagnosis Date    A-fib ([facility] [address]     Aneurysm (HCC)     Burning with urination     Cysto & BRP    CAD (coronary artery disease)     A fib    Cancer (HCC)     prostate    Depression     Dizziness     Dizziness     GERD (gastroesophageal reflux disease)     Headache     Hypercholesterolemia     Hypertension     Hypotension     Pulmonary disease     Pulmonary disease     Sleep apnea     Thyroid disease        Past Surgical History:  Past Surgical History:   Procedure Laterality Date    CARDIAC SURG PROCEDURE UNLIST      Catheterization, Defibrillator    HX AFIB ABLATION      HX CHOLECYSTECTOMY      HX COLONOSCOPY      2018    HX UROLOGICAL         Family History:  Family History   Problem Relation Age of Onset    Heart Disease Father     Heart Attack Father     Prostate Cancer Brother     Colon Cancer Brother     Cancer Brother        Social History:  Social History Tobacco Use    Smoking status: Former Smoker    Smokeless tobacco: Never Used   Substance Use Topics    Alcohol use: Never    Drug use: Never       Allergies:  No Known Allergies      Review of Systems   Constitutional: Negative except as in HPI. Eyes: Negative except as in HPI.  ENT: Negative except as in HPI. Cardiovascular: Negative except as in HPI. Respiratory: Negative except as in HPI. Gastrointestinal: Negative except as in HPI. Genitourinary: Negative except as in HPI. Musculoskeletal: Negative except as in HPI. Integumentary: Negative except as in HPI. Neurological: Negative except as in HPI. Psychiatric: Negative except as in HPI. Endocrine: Negative except as in HPI. Hematologic/Lymphatic: Negative except as in HPI. Allergic/Immunologic: Negative except as in HPI. Physical Exam   Constitutional: Awake and alert, interactive, NAD  Eyes: PERRL, no injection or scleral icterus, no discharge  HEENT: NCAT, neck supple, MMM, no oropharyngeal exudates  CV: Irregularly irregular, no m/r/g  Respiratory: CTAB, no r/r/w  GI: Abd soft, nondistended, TTP suprapubically  : Deferred  MSK: no joint effusions or edema  Skin: No rashes  Neuro: CN2-12 intact, 5/5 strength throughout. SILT distally. No dysmetria on FNF or HTS, dysdiadochokinesia. No pronator drift. Gait deferred.   Psych: Well-groomed, normal speech, behavior, appropriate mood    Lab and Diagnostic Study Results     Labs -     Recent Results (from the past 12 hour(s))   ECHO ADULT COMPLETE    Collection Time: 12/11/21  9:02 AM   Result Value Ref Range    Aortic Valve Systolic Mean Gradient 3.36 mmHg    AoV VTI 37.00 cm    Aortic valve mean velocity 105.00 cm/s    Aortic Valve Systolic Peak Velocity 543.97 cm/s    AoV PG 9.00 mmHg    Aortic Valve Area by Continuity of VTI 1.27 cm2    Aortic Valve Area by Continuity of Peak Velocity 1.43 cm2    Ao Root D 3.00 cm    IVSd 1.43 (A) 0.6 - 1.0 cm    LVIDd 5.31 4.2 - 5.9 cm    LVIDs 3.92 cm LVOT d 2.00 cm    Left Ventricular Outflow Tract Mean Gradient 1.00 mmHg    LVOT VTI 15.00 cm    LVOT VTI 15.00 cm    LVOT Peak Velocity 69.60 cm/s    LVOT Peak Gradient 2.00 mmHg    LVPWd 1.31 (A) 0.6 - 1.0 cm    LV ED Vol A2C 150.00 cm3    LV ES Vol A2C 60.20 cm3    LVOT SV 47.0 cm3    Left Atrium Major Axis 5.60 cm    Left Atrium Major Axis 5.60 cm    Mitral Valve E Wave Deceleration Time 140.00 ms    MV E Fabian 104.00 cm/s    Pulmonic Valve Max Velocity 76.90 cm/s    Pulmonic Valve Systolic Peak Instantaneous Gradient 2.00 mmHg    Est. RA Pressure 8.00 mmHg    RVIDd 3.90 cm    RVSP 32.00 mmHg    Tricuspid Valve Max Velocity 243.00 cm/s    Triscuspid Valve Regurgitation Peak Gradient 24.00 mmHg    Right Atrial Area 4C 26.60 cm2    LV Mass .1 88 - 224 g    LV Mass AL Index 158.2 49 - 115 g/m2    BLANCA/BSA Pk Fabian 0.7 cm2/m2    BLANCA/BSA VTI 0.6 cm2/m2       Radiologic Studies -   [unfilled]  CT Results  (Last 48 hours)                 12/09/21 2300  CTA HEAD NECK W CONT Final result    Impression:      1. Chronic appearing right cerebellar infarct. 2.  Age-appropriate global volume loss and atherosclerotic disease. 3.  No hemodynamically significant stenosis, occlusion, dissection, or   aneurysmal dilatation of the cervical or intracranial vessels. 4.  Nodular thyroid. 5.  Cervical degenerative osteoarthritic changes. Narrative:  CTA HEAD NECK W CONT     12/9/2021 11:00 PM ; SRM         Indication: 80years old;  Male; Syncope; Technique: Noncontrast head CT. Arterial phase contrast-enhanced CT angiography   of the neck and head was also performed. Multiplanar MIP reconstructions were   provided for review. The measurement of stenoses included in this report have   been estimated according to the [place] Symptomatic Carotid   Endarterectomy Trial (NASCET) method for calculating the degree of stenosis.         Contrast: 100 ml of Isovue-370       Dose reduction: All CT scans at this facility are performed using dose reduction   optimization techniques as appropriate to the performed exam including the   following: Automatic exposure control; adjustment of the mA and/or kV according   to patient size; or the use of reconstruction techniques. Comparison: None       Findings:       Noncontrast CT of the head:       Global volume loss with portion of ventricular and sulcal prominence. Chronic   appearing right cerebellar infarct. There is no hemorrhage, mass, edema,   hydrocephalus, or midline shift. Gray-white differentiation is maintained. Midline sagittal anatomic morphology is normal. The calvarium is intact. No   osseous abnormalities are evident. The native lenses are absent. The paranasal sinuses are well-aerated. The   mastoid air cells are well-aerated. CT angiography of the neck and head:       Arch: There is a normal branching pattern of the great vessels from the aorta. The   brachiocephalic and imaged subclavian arteries are patent bilaterally. Right anterior circulation:       The common carotid artery, carotid bifurcation, internal carotid artery, A1   segment, M1 segment, and more distal anterior and middle cerebral arterial   distributions are widely patent. Left anterior circulation:       The common carotid artery, carotid bifurcation, internal carotid artery, A1   segment, M1 segment, and more distal anterior and middle cerebral arterial   distributions are widely patent. Posterior circulation:       Left vertebral artery dominance. The extradural and intradural vertebral   arteries are normal in course and caliber. The basilar artery is normal. The   posterior cerebral arteries are normal.           Dural sinuses: Within limitations of arterial phase contrast technique, the partially opacified   dural sinuses are unremarkable.        Neck:       No cystic, necrotic, calcified, or pathologically enlarged cervical adenopathy. Thyroid nodularity. Emphysema at the lung apices. Cervical generative osteoarthritic changes. Patient is edentulous. CXR Results  (Last 48 hours)                 12/09/21 2017  XR CHEST SNGL V Final result    Impression:  Mild enlargement of cardiopericardial silhouette with stable   positioning of right ventricular AICD lead. There is obscuration of a portion of   the left hemithorax by the generator device. No evidence of pneumothorax. Central vascular prominence. Nonspecific central peribronchial cuffing. No   evidence of pulmonary edema, air space pneumonia, or pleural effusion. Narrative:  Portable chest, AP upright, 2010 hours. Comparison with 10/6/2012. Medical Decision Making and ED Course   - I am the first and primary provider for this patient AND AM THE PRIMARY PROVIDER OF RECORD. - I reviewed the vital signs, available nursing notes, past medical history, past surgical history, family history and social history. - Initial assessment performed. The patients presenting problems have been discussed, and the staff are in agreement with the care plan formulated and outlined with them. I have encouraged them to ask questions as they arise throughout their visit. Vital Signs-Reviewed the patient's vital signs. Patient Vitals for the past 12 hrs:   Temp Pulse Resp BP SpO2   12/11/21 1132 98 °F (36.7 °C) 73 18 (!) 102/58 92 %   12/11/21 0757 98 °F (36.7 °C) 85 18 118/69 94 %   12/11/21 0319 98.1 °F (36.7 °C) 74 17 107/66 93 %       EKG interpretation: Afib w/PVCs @68bpm, nl QRS/QTc/axis, no BRIE/STD,, inferolateral TWI/flattening but poor baseline. Provider Notes (Medical Decision Making):   89M w/near syncope. Broad differential including infx, cardiac, vertebrobasilar insufficiency, will get CTA head & neck, UA, CXR, bloodwork to eval. Frequent PVCs w/o mechanical capture on tele.  Possibly vasovagal given recent meal w/abd pain. Dispo likely obs admit pending workup. ED Course:       ED Course as of 12/11/21 1426   Thu Dec 09, 2021   2140   IMPRESSION  Mild enlargement of cardiopericardial silhouette with stable  positioning of right ventricular AICD lead. There is obscuration of a portion of  the left hemithorax by the generator device. No evidence of pneumothorax. Central vascular prominence. Nonspecific central peribronchial cuffing. No  evidence of pulmonary edema, air space pneumonia, or pleural effusion [YA]      ED Course User Index  [YA] Gia Orellana MD         Consultations:     Hospitalist Dr. Mary Landaverde        Disposition     Disposition: Admitted to Floor Medical Floor the case was discussed with the admitting physician Dr. Mary Landaverde    Admitted      Diagnosis     Clinical Impression:   1. Near syncope        Attestations:     Reji Rodriguez MD

## 2021-12-10 NOTE — PROGRESS NOTES
Hospitalist Progress Note               Daily Progress Note: 12/10/2021      Subjective: The patient is seen for follow up. He is a 80-year-old male with a history of hypertension, CAD, heart failure, AICD, atrial fibrillation and prostate cancer, normally followed by the [place] who presented the ED after having a near syncopal episode while driving on the highway. He pulled over to the side and called a family member who subsequently brought him to the ED. Symptoms started about 30 minutes after eating dinner. He was complaining of some lower abdominal and suprapubic discomfort as well but no nausea or vomiting. In the ED blood pressure was reported at 74/56 and a heart rate in the 40s. He received fluids in the ED. Labs showed a hemoglobin of 10.8, unremarkable chemistries. Blood pressure on arrival to the ED was 105/60. Patient was not orthostatic. His home antihypertensives consist of Carvedilol, Candesartan and furosemide    CTA of the head and neck were done which showed a chronic right cerebellar infarct but no large vessel occlusion    Upon reviewing the EMS note, they indicated a twelve-lead EKG showed \"third-degree block\" with a heart rate in the 30s and 40s. Unfortunately, we do not have any documentation of this.   There was no evidence for complete heart block here    Patient has had his pacemaker interrogated but we do not have this report yet    Problem List:  Problem List as of 12/10/2021 Date Reviewed: 9/10/2020          Codes Class Noted - Resolved    Postural dizziness with presyncope ICD-10-CM: R42, R55  ICD-9-CM: 780.4, 780.2  12/10/2021 - Present        Lower urinary tract symptoms ICD-10-CM: R39.9  ICD-9-CM: 788.99  7/22/2020 - Present        Orchitis and epididymitis ICD-10-CM: N45.3  ICD-9-CM: 604.90  7/22/2020 - Present        Raised prostate specific antigen ICD-10-CM: R97.20  ICD-9-CM: 790.93  7/22/2020 - Present        Slowing of urinary stream ICD-10-CM: R39.198  ICD-9-CM: 788.62  7/22/2020 - Present        Urinary (tract) obstruction ICD-10-CM: N13.9  ICD-9-CM: 599.60  7/22/2020 - Present        Enlarged prostate with urinary obstruction ICD-10-CM: N40.1, N13.8  ICD-9-CM: 600.01, 599.69  7/15/2020 - Present        Bladder neck obstruction ICD-10-CM: N32.0  ICD-9-CM: 596.0  7/15/2020 - Present        Chronic epididymitis ICD-10-CM: N45.1  ICD-9-CM: 604.90  7/15/2020 - Present        Chronic prostatitis ICD-10-CM: N41.1  ICD-9-CM: 601.1  7/15/2020 - Present        Juvenal hematuria ICD-10-CM: R31.0  ICD-9-CM: 599.71  7/15/2020 - Present        Incomplete emptying of bladder ICD-10-CM: R33.9  ICD-9-CM: 788.21  7/15/2020 - Present        Increased frequency of urination ICD-10-CM: R35.0  ICD-9-CM: 788.41  7/15/2020 - Present        Leukocytes in urine ICD-10-CM: R82.998  ICD-9-CM: 791.7  7/15/2020 - Present        Microscopic hematuria ICD-10-CM: R31.29  ICD-9-CM: 599.72  7/15/2020 - Present        Must strain to pass urine ICD-10-CM: R39.16  ICD-9-CM: 788.65  7/15/2020 - Present        Nocturia ICD-10-CM: R35.1  ICD-9-CM: 788.43  7/15/2020 - Present        Aneurysm ([address] ICD-10-CM: I72.9  ICD-9-CM: 442.9  Unknown - Present              Medications reviewed  Current Facility-Administered Medications   Medication Dose Route Frequency    [Held by provider] eplerenone (INSPRA) tablet 25 mg  25 mg Oral DAILY    [Held by provider] furosemide (LASIX) tablet 40 mg  40 mg Oral DAILY    sertraline (ZOLOFT) tablet 100 mg  100 mg Oral QPM    docusate sodium (COLACE) capsule 100 mg  100 mg Oral BID    apixaban (ELIQUIS) tablet 5 mg  5 mg Oral BID    finasteride (PROSCAR) tablet 5 mg  5 mg Oral DAILY    lisinopriL (PRINIVIL, ZESTRIL) tablet 5 mg  5 mg Oral DAILY    melatonin tablet 3 mg  3 mg Oral QHS    pravastatin (PRAVACHOL) tablet 40 mg  40 mg Oral QHS    tamsulosin (FLOMAX) capsule 0.4 mg  0.4 mg Oral DAILY    traMADoL (ULTRAM) tablet 50 mg  50 mg Oral Q6H PRN    traZODone (DESYREL) tablet 50 mg  50 mg Oral QHS    dorzolamide (TRUSOPT) 2 % ophthalmic solution 1 Drop  1 Drop Both Eyes TID    amiodarone (CORDARONE) tablet 200 mg  200 mg Oral DAILY    sodium chloride (NS) flush 5-40 mL  5-40 mL IntraVENous Q8H    sodium chloride (NS) flush 5-40 mL  5-40 mL IntraVENous PRN    acetaminophen (TYLENOL) tablet 650 mg  650 mg Oral Q6H PRN    Or    acetaminophen (TYLENOL) suppository 650 mg  650 mg Rectal Q6H PRN    polyethylene glycol (MIRALAX) packet 17 g  17 g Oral DAILY PRN    ondansetron (ZOFRAN ODT) tablet 4 mg  4 mg Oral Q8H PRN    Or    ondansetron (ZOFRAN) injection 4 mg  4 mg IntraVENous Q6H PRN    0.9% sodium chloride infusion  50 mL/hr IntraVENous CONTINUOUS     Current Outpatient Medications   Medication Sig    amiodarone (CORDARONE) 200 mg tablet Take 200 mg by mouth daily.  megestroL (MEGACE) 40 mg tablet Take 1 Tab by mouth two (2) times a day for 90 days.  folic acid/multivit-min/lutein (CENTRUM SILVER PO) Take  by mouth.  dorzolamide (TRUSOPT) 2 % ophthalmic solution Administer 2 Drops to both eyes three (3) times daily.  lidocaine 5 % topical cream Apply  to affected area two (2) times daily as needed.  sertraline (ZOLOFT) 50 mg tablet Take  by mouth daily.  acetaminophen (TYLENOL) 500 mg tablet Take  by mouth every six (6) hours as needed for Pain.  megestroL (MEGACE) 40 mg tablet Take 1 Tab by mouth daily.  alfuzosin SR (UROXATRAL) 10 mg SR tablet Take  by mouth daily.  aspirin delayed-release 81 mg tablet Take  by mouth daily.  candesartan (ATACAND) 8 mg tablet Take  by mouth daily.  carvediloL (COREG) 6.25 mg tablet Take  by mouth two (2) times daily (with meals).  multivitamin (ONE A DAY) tablet Take 1 Tab by mouth daily.  docusate sodium (COLACE) 100 mg capsule Take 100 mg by mouth two (2) times a day.  dutasteride (AVODART) 0.5 mg capsule Take 0.5 mg by mouth daily.     apixaban (Eliquis) 5 mg tablet Take 5 mg by mouth two (2) times a day.  eplerenone (INSPRA) 25 mg tablet Take  by mouth daily.  finasteride (PROSCAR) 5 mg tablet Take 5 mg by mouth daily.  furosemide (LASIX) 40 mg tablet Take  by mouth daily.  lisinopriL (PRINIVIL, ZESTRIL) 5 mg tablet Take  by mouth daily.  leuprolide depot (Lupron Depot, 6 Month,) 45 mg injection 45 mg by IntraMUSCular route once.  melatonin 1 mg tablet Take  by mouth.  pravastatin (PRAVACHOL) 40 mg tablet Take 40 mg by mouth nightly.  tamsulosin (FLOMAX) 0.4 mg capsule Take 0.4 mg by mouth daily.  traMADoL (ULTRAM) 50 mg tablet Take 50 mg by mouth every six (6) hours as needed for Pain.  traZODone (DESYREL) 50 mg tablet Take  by mouth nightly.  cholecalciferol (Vitamin D3) (1000 Units /25 mcg) tablet Take  by mouth daily. Review of Systems:   A comprehensive review of systems was negative except for that written in the HPI. Objective:   Physical Exam:     Visit Vitals  /77   Pulse 80   Temp 97.6 °F (36.4 °C)   Resp 16   Ht 5' 7\" (1.702 m)   Wt 83.9 kg (185 lb)   SpO2 96%   BMI 28.98 kg/m²    O2 Flow Rate (L/min): 2 l/min O2 Device: None (Room air)    Temp (24hrs), Av.6 °F (36.4 °C), Min:97.6 °F (36.4 °C), Max:97.6 °F (36.4 °C)    No intake/output data recorded. No intake/output data recorded. General:   Awake and alert   Lungs:   Clear to auscultation bilaterally. Chest wall:  No tenderness or deformity. Heart:  Regular rate and rhythm, S1, S2 normal, no murmur, click, rub or gallop. Abdomen:   Soft, non-tender. Bowel sounds normal. No masses,  No organomegaly. Extremities: Extremities normal, atraumatic, no cyanosis or edema. Pulses: 2+ and symmetric all extremities. Skin: Skin color, texture, turgor normal. No rashes or lesions   Neurologic: CNII-XII intact.   No gross focal deficits         Data Review:       Recent Days:  Recent Labs     12/10/21  0404 21   WBC 7.6 8.2   HGB 10.2* 10.8*   HCT 33.1* 35.1*   * 149*     Recent Labs     12/10/21  0404 12/09/21 2024   * 143   K 4.3 4.5   * 113*   CO2 26 27   * 146*   BUN 20 21*   CREA 1.17 1.29   CA 8.5 8.2*   ALB  --  3.0*   TBILI  --  0.5   ALT  --  20     No results for input(s): PH, PCO2, PO2, HCO3, FIO2 in the last 72 hours. 24 Hour Results:  Recent Results (from the past 24 hour(s))   EKG, 12 LEAD, INITIAL    Collection Time: 12/09/21  8:04 PM   Result Value Ref Range    Ventricular Rate 68 BPM    Atrial Rate 86 BPM    QRS Duration 90 ms    Q-T Interval 388 ms    QTC Calculation (Bezet) 412 ms    Calculated R Axis 89 degrees    Calculated T Axis 0 degrees    Diagnosis       Atrial fibrillation with a competing junctional pacemaker with ventricular   escape complexes  Low voltage QRS  Septal infarct (cited on or before 03-JAN-2020)  Abnormal ECG  When compared with ECG of 03-JAN-2020 14:15,  Sinus rhythm is now with ventricular escape complexes  Questionable change in initial forces of Septal leads  Confirmed by GRZEGORZ MARINELLI, Lizandro Garay ([id number]) on 12/10/2021 9:47:38 AM     CBC WITH AUTOMATED DIFF    Collection Time: 12/09/21  8:24 PM   Result Value Ref Range    WBC 8.2 4.1 - 11.1 K/uL    RBC 3.32 (L) 4.10 - 5.70 M/uL    HGB 10.8 (L) 12.1 - 17.0 g/dL    HCT 35.1 (L) 36.6 - 50.3 %    .7 (H) 80.0 - 99.0 FL    MCH 32.5 26.0 - 34.0 PG    MCHC 30.8 30.0 - 36.5 g/dL    RDW 12.2 11.5 - 14.5 %    PLATELET 276 (L) 073 - 400 K/uL    MPV 11.6 8.9 - 12.9 FL    NRBC 0.0 0.0  WBC    ABSOLUTE NRBC 0.00 0.00 - 0.01 K/uL    NEUTROPHILS 81 (H) 32 - 75 %    LYMPHOCYTES 9 (L) 12 - 49 %    MONOCYTES 8 5 - 13 %    EOSINOPHILS 1 0 - 7 %    BASOPHILS 0 0 - 1 %    IMMATURE GRANULOCYTES 1 (H) 0 - 0.5 %    ABS. NEUTROPHILS 6.6 1.8 - 8.0 K/UL    ABS. LYMPHOCYTES 0.8 0.8 - 3.5 K/UL    ABS. MONOCYTES 0.6 0.0 - 1.0 K/UL    ABS. EOSINOPHILS 0.1 0.0 - 0.4 K/UL    ABS. BASOPHILS 0.0 0.0 - 0.1 K/UL    ABS. IMM.  GRANS. 0.0 0.00 - 0.04 K/UL    DF AUTOMATED METABOLIC PANEL, COMPREHENSIVE    Collection Time: 12/09/21  8:24 PM   Result Value Ref Range    Sodium 143 136 - 145 mmol/L    Potassium 4.5 3.5 - 5.1 mmol/L    Chloride 113 (H) 97 - 108 mmol/L    CO2 27 21 - 32 mmol/L    Anion gap 3 (L) 5 - 15 mmol/L    Glucose 146 (H) 65 - 100 mg/dL    BUN 21 (H) 6 - 20 mg/dL    Creatinine 1.29 0.70 - 1.30 mg/dL    BUN/Creatinine ratio 16 12 - 20      GFR est AA >60 >60 ml/min/1.73m2    GFR est non-AA 52 (L) >60 ml/min/1.73m2    Calcium 8.2 (L) 8.5 - 10.1 mg/dL    Bilirubin, total 0.5 0.2 - 1.0 mg/dL    AST (SGOT) 18 15 - 37 U/L    ALT (SGPT) 20 12 - 78 U/L    Alk.  phosphatase 55 45 - 117 U/L    Protein, total 5.4 (L) 6.4 - 8.2 g/dL    Albumin 3.0 (L) 3.5 - 5.0 g/dL    Globulin 2.4 2.0 - 4.0 g/dL    A-G Ratio 1.3 1.1 - 2.2     TROPONIN-HIGH SENSITIVITY    Collection Time: 12/09/21  8:24 PM   Result Value Ref Range    Troponin-High Sensitivity 15 0 - 76 ng/L   URINALYSIS W/ RFLX MICROSCOPIC    Collection Time: 12/09/21 10:30 PM   Result Value Ref Range    Color Yellow/Straw      Appearance Clear Clear      Specific gravity 1.013 1.003 - 1.030      pH (UA) 5.0 5.0 - 8.0      Protein Negative Negative mg/dL    Glucose Negative Negative mg/dL    Ketone Negative Negative mg/dL    Bilirubin Negative Negative      Blood Large (A) Negative      Urobilinogen 0.1 0.1 - 1.0 EU/dL    Nitrites Negative Negative      Leukocyte Esterase Negative Negative     URINE MICROSCOPIC    Collection Time: 12/09/21 10:30 PM   Result Value Ref Range    WBC 0-4 0 - 4 /hpf    RBC 20-50 0 - 5 /hpf    Bacteria Negative Negative /hpf    Mucus Trace (A) Negative /lpf    Hyaline cast 0-2 0 - 5 /lpf   METABOLIC PANEL, BASIC    Collection Time: 12/10/21  4:04 AM   Result Value Ref Range    Sodium 147 (H) 136 - 145 mmol/L    Potassium 4.3 3.5 - 5.1 mmol/L    Chloride 117 (H) 97 - 108 mmol/L    CO2 26 21 - 32 mmol/L    Anion gap 4 (L) 5 - 15 mmol/L    Glucose 115 (H) 65 - 100 mg/dL    BUN 20 6 - 20 mg/dL Creatinine 1.17 0.70 - 1.30 mg/dL    BUN/Creatinine ratio 17 12 - 20      GFR est AA >60 >60 ml/min/1.73m2    GFR est non-AA 58 (L) >60 ml/min/1.73m2    Calcium 8.5 8.5 - 10.1 mg/dL   CBC W/O DIFF    Collection Time: 12/10/21  4:04 AM   Result Value Ref Range    WBC 7.6 4.1 - 11.1 K/uL    RBC 3.17 (L) 4.10 - 5.70 M/uL    HGB 10.2 (L) 12.1 - 17.0 g/dL    HCT 33.1 (L) 36.6 - 50.3 %    .4 (H) 80.0 - 99.0 FL    MCH 32.2 26.0 - 34.0 PG    MCHC 30.8 30.0 - 36.5 g/dL    RDW 12.3 11.5 - 14.5 %    PLATELET 658 (L) 894 - 400 K/uL    MPV 11.7 8.9 - 12.9 FL    NRBC 0.0 0.0  WBC    ABSOLUTE NRBC 0.00 0.00 - 0.01 K/uL       CTA HEAD NECK W CONT   Final Result      1. Chronic appearing right cerebellar infarct. 2.  Age-appropriate global volume loss and atherosclerotic disease. 3.  No hemodynamically significant stenosis, occlusion, dissection, or   aneurysmal dilatation of the cervical or intracranial vessels. 4.  Nodular thyroid. 5.  Cervical degenerative osteoarthritic changes. XR CHEST SNGL V   Final Result   Mild enlargement of cardiopericardial silhouette with stable   positioning of right ventricular AICD lead. There is obscuration of a portion of   the left hemithorax by the generator device. No evidence of pneumothorax. Central vascular prominence. Nonspecific central peribronchial cuffing. No   evidence of pulmonary edema, air space pneumonia, or pleural effusion.            Assessment:  Presyncope, etiology unclear although concerning for bradyarrhythmia versus complete heart block with pacemaker malfunction as reportedly seen by EMS although unable to verify with hard copy of tracing    Chronic systolic heart failure, EF unknown    Coronary artery disease, details unknown    Probable chronic kidney disease stage III    Paroxysmal atrial fibrillation, on amiodarone and Eliquis    History of prostate cancer    Likely chronic anemia      Plan:  I have changed admission to cardiac telemetry rather than 5 W. Consult cardiology  We will try to track down his AICD interrogation results      Care Plan discussed with: Patient/Family    Disposition: Likely discharge if no evidence for arrhythmia or heart block    Total time spent with patient: 30 minutes.     Mary Chan MD

## 2021-12-10 NOTE — ED NOTES
Nurse called support team at [place] [phone] looking for the results of interrogation. Abby states system down till 4am and they will fax it after 4am Dr. Mary Landaverde notified.

## 2021-12-10 NOTE — H&P
GENERAL GENERIC H&P/CONSULT    Subjective:  61-year-old male with past medical history including hypertension, CAD, CHF status post AICD, A. fib on Eliquis, history of prostate CA. Patient presented to the emergency department after feeling lightheaded and near passing out while he was driving on the highway. He pulled over and called family member subsequently brought to the ER. Symptoms started about half an hour after supper, he has complained of lower abdominal/suprapubic discomfort. Denies nausea or vomiting. Denies urinary symptoms. Reportedly in the ambulance his blood pressure was low at 74/56, heart rate in the 40s. He has received IV fluid. In the ER BP is in the low normal side, orthostatic BP is negative. Patient is afebrile, has no leukocytosis. UA is clear. H&H of 10.8/35.1,  with unknown baseline. Creatinine is 1.2, unknown baseline. Normal troponin, AICD on board and has paced rhythm. CTA head and neck without reported intracranial process or LVO. Chronic appearing right cerebellar infarct noted. [facility], nearby [place] facility was contacted and did not have a bed therefore will be admitted here for further management. Past Medical History:   Diagnosis Date    A-fib ([address]     Aneurysm ([address]     Burning with urination     Cysto & BRP    CAD (coronary artery disease)     A fib    Cancer (HCC)     prostate    Depression     Dizziness     Dizziness     GERD (gastroesophageal reflux disease)     Headache     Hypercholesterolemia     Hypertension     Hypotension     Pulmonary disease     Pulmonary disease     Sleep apnea     Thyroid disease       Past Surgical History:   Procedure Laterality Date    CARDIAC SURG PROCEDURE UNLIST      Catheterization, Defibrillator    HX AFIB ABLATION      HX CHOLECYSTECTOMY      HX COLONOSCOPY      2018    HX UROLOGICAL        Prior to Admission medications    Medication Sig Start Date End Date Taking?  Authorizing Provider   amiodarone (CORDARONE) 200 mg tablet Take 200 mg by mouth daily. Yes Provider, Historical   megestroL (MEGACE) 40 mg tablet Take 1 Tab by mouth two (2) times a day for 90 days. 9/10/20 12/9/20  Vick Esquivel MD   folic acid/multivit-min/lutein (CENTRUM SILVER PO) Take  by mouth. Provider, Historical   dorzolamide (TRUSOPT) 2 % ophthalmic solution Administer 2 Drops to both eyes three (3) times daily. Provider, Historical   lidocaine 5 % topical cream Apply  to affected area two (2) times daily as needed. Provider, Historical   sertraline (ZOLOFT) 50 mg tablet Take  by mouth daily. Provider, Historical   acetaminophen (TYLENOL) 500 mg tablet Take  by mouth every six (6) hours as needed for Pain. Provider, Historical   megestroL (MEGACE) 40 mg tablet Take 1 Tab by mouth daily. 7/21/20   Vick Esquivel MD   alfuzosin SR (UROXATRAL) 10 mg SR tablet Take  by mouth daily. Provider, Historical   aspirin delayed-release 81 mg tablet Take  by mouth daily. Provider, Historical   candesartan (ATACAND) 8 mg tablet Take  by mouth daily. Provider, Historical   carvediloL (COREG) 6.25 mg tablet Take  by mouth two (2) times daily (with meals). Provider, Historical   multivitamin (ONE A DAY) tablet Take 1 Tab by mouth daily. Provider, Historical   docusate sodium (COLACE) 100 mg capsule Take 100 mg by mouth two (2) times a day. Provider, Historical   dutasteride (AVODART) 0.5 mg capsule Take 0.5 mg by mouth daily. Provider, Historical   apixaban (Eliquis) 5 mg tablet Take 5 mg by mouth two (2) times a day. Provider, Historical   eplerenone (INSPRA) 25 mg tablet Take  by mouth daily. Provider, Historical   finasteride (PROSCAR) 5 mg tablet Take 5 mg by mouth daily. Provider, Historical   furosemide (LASIX) 40 mg tablet Take  by mouth daily. Provider, Historical   lisinopriL (PRINIVIL, ZESTRIL) 5 mg tablet Take  by mouth daily.     Provider, Historical   leuprolide depot (Lupron Depot, 6 Month,) 45 mg injection 45 mg by IntraMUSCular route once. Provider, Historical   melatonin 1 mg tablet Take  by mouth. Provider, Historical   pravastatin (PRAVACHOL) 40 mg tablet Take 40 mg by mouth nightly. Provider, Historical   tamsulosin (FLOMAX) 0.4 mg capsule Take 0.4 mg by mouth daily. Provider, Historical   traMADoL (ULTRAM) 50 mg tablet Take 50 mg by mouth every six (6) hours as needed for Pain. Provider, Historical   traZODone (DESYREL) 50 mg tablet Take  by mouth nightly. Provider, Historical   cholecalciferol (Vitamin D3) (1000 Units /25 mcg) tablet Take  by mouth daily. Provider, Historical     No Known Allergies   Social History     Tobacco Use    Smoking status: Former Smoker    Smokeless tobacco: Never Used   Substance Use Topics    Alcohol use: Never      Family History   Problem Relation Age of Onset    Heart Disease Father     Heart Attack Father     Prostate Cancer Brother     Colon Cancer Brother     Cancer Brother       Review of Systems   Constitutional: Negative for appetite change, chills, diaphoresis and fever. HENT: Negative. Eyes: Negative for pain and visual disturbance. Respiratory: Negative. Cardiovascular: Negative. Gastrointestinal: Negative. Endocrine: Negative. Genitourinary: Negative. Musculoskeletal: Negative. Skin: Negative. Neurological: Positive for light-headedness. Negative for syncope, facial asymmetry, weakness, numbness and headaches. Psychiatric/Behavioral: Negative. Objective:    No intake/output data recorded. No intake/output data recorded.   Patient Vitals for the past 8 hrs:   BP Temp Pulse Resp SpO2 Height Weight   12/10/21 0158 100/60  89       12/10/21 0152 106/67  89       12/10/21 0149 (!) 96/55  70       12/09/21 2017      5' 7\" (1.702 m) 83.9 kg (185 lb)   12/09/21 2005 105/60 97.6 °F (36.4 °C) 66 17 99 %       Physical Exam  Constitutional: General: He is not in acute distress. Appearance: Normal appearance. HENT:      Head: Normocephalic and atraumatic. Mouth/Throat:      Pharynx: Oropharynx is clear. Eyes:      Extraocular Movements: Extraocular movements intact. Conjunctiva/sclera: Conjunctivae normal.      Pupils: Pupils are equal, round, and reactive to light. Cardiovascular:      Rate and Rhythm: Regular rhythm. Bradycardia present. Pulses: Normal pulses. Heart sounds: Normal heart sounds. No murmur heard. No friction rub. No gallop. Pulmonary:      Effort: Pulmonary effort is normal.      Breath sounds: Normal breath sounds. Abdominal:      General: Bowel sounds are normal.      Palpations: Abdomen is soft. Musculoskeletal:         General: Normal range of motion. Cervical back: Normal range of motion and neck supple. Skin:     General: Skin is warm and dry. Neurological:      General: No focal deficit present. Mental Status: He is alert. Mental status is at baseline. Cranial Nerves: No cranial nerve deficit. Motor: No weakness. Labs:    Recent Results (from the past 24 hour(s))   CBC WITH AUTOMATED DIFF    Collection Time: 12/09/21  8:24 PM   Result Value Ref Range    WBC 8.2 4.1 - 11.1 K/uL    RBC 3.32 (L) 4.10 - 5.70 M/uL    HGB 10.8 (L) 12.1 - 17.0 g/dL    HCT 35.1 (L) 36.6 - 50.3 %    .7 (H) 80.0 - 99.0 FL    MCH 32.5 26.0 - 34.0 PG    MCHC 30.8 30.0 - 36.5 g/dL    RDW 12.2 11.5 - 14.5 %    PLATELET 695 (L) 717 - 400 K/uL    MPV 11.6 8.9 - 12.9 FL    NRBC 0.0 0.0  WBC    ABSOLUTE NRBC 0.00 0.00 - 0.01 K/uL    NEUTROPHILS 81 (H) 32 - 75 %    LYMPHOCYTES 9 (L) 12 - 49 %    MONOCYTES 8 5 - 13 %    EOSINOPHILS 1 0 - 7 %    BASOPHILS 0 0 - 1 %    IMMATURE GRANULOCYTES 1 (H) 0 - 0.5 %    ABS. NEUTROPHILS 6.6 1.8 - 8.0 K/UL    ABS. LYMPHOCYTES 0.8 0.8 - 3.5 K/UL    ABS. MONOCYTES 0.6 0.0 - 1.0 K/UL    ABS. EOSINOPHILS 0.1 0.0 - 0.4 K/UL    ABS.  BASOPHILS 0.0 0.0 - 0.1 K/UL    ABS. IMM. GRANS. 0.0 0.00 - 0.04 K/UL    DF AUTOMATED     METABOLIC PANEL, COMPREHENSIVE    Collection Time: 12/09/21  8:24 PM   Result Value Ref Range    Sodium 143 136 - 145 mmol/L    Potassium 4.5 3.5 - 5.1 mmol/L    Chloride 113 (H) 97 - 108 mmol/L    CO2 27 21 - 32 mmol/L    Anion gap 3 (L) 5 - 15 mmol/L    Glucose 146 (H) 65 - 100 mg/dL    BUN 21 (H) 6 - 20 mg/dL    Creatinine 1.29 0.70 - 1.30 mg/dL    BUN/Creatinine ratio 16 12 - 20      GFR est AA >60 >60 ml/min/1.73m2    GFR est non-AA 52 (L) >60 ml/min/1.73m2    Calcium 8.2 (L) 8.5 - 10.1 mg/dL    Bilirubin, total 0.5 0.2 - 1.0 mg/dL    AST (SGOT) 18 15 - 37 U/L    ALT (SGPT) 20 12 - 78 U/L    Alk. phosphatase 55 45 - 117 U/L    Protein, total 5.4 (L) 6.4 - 8.2 g/dL    Albumin 3.0 (L) 3.5 - 5.0 g/dL    Globulin 2.4 2.0 - 4.0 g/dL    A-G Ratio 1.3 1.1 - 2.2     TROPONIN-HIGH SENSITIVITY    Collection Time: 12/09/21  8:24 PM   Result Value Ref Range    Troponin-High Sensitivity 15 0 - 76 ng/L   URINALYSIS W/ RFLX MICROSCOPIC    Collection Time: 12/09/21 10:30 PM   Result Value Ref Range    Color Yellow/Straw      Appearance Clear Clear      Specific gravity 1.013 1.003 - 1.030      pH (UA) 5.0 5.0 - 8.0      Protein Negative Negative mg/dL    Glucose Negative Negative mg/dL    Ketone Negative Negative mg/dL    Bilirubin Negative Negative      Blood Large (A) Negative      Urobilinogen 0.1 0.1 - 1.0 EU/dL    Nitrites Negative Negative      Leukocyte Esterase Negative Negative     URINE MICROSCOPIC    Collection Time: 12/09/21 10:30 PM   Result Value Ref Range    WBC 0-4 0 - 4 /hpf    RBC 20-50 0 - 5 /hpf    Bacteria Negative Negative /hpf    Mucus Trace (A) Negative /lpf    Hyaline cast 0-2 0 - 5 /lpf       Assessment:  Active Problems:    Postural dizziness with presyncope (12/10/2021)    Presyncope  -Low BP by EMS, has received NS with some improvment. In the ER orthostatic BPs negative  -Pacemaker interrogation performed, report is pending. Has paced rhythm  -Physical exam is nonfocal and CTA head/neck without acute finding  -Temporarily hold diuretics  -Gentle hydration overnight  -Admitted on observation    TAMIKA versus CKD stage III  -Creatinine is 1.29, baseline is unknown  -Monitor with BMP    Macrocytic anemia  -H&H is 10.8/35.1, . Unknown baseline  -Suspect he may have baseline anemia in the setting of cancer treatment,? CKD   -Denies dark stools  -Obtain anemia panel including folic acid, I53    Nodular thyroid  -Incidental finding on CT  -May need dedicated ultrasound probably in the outpatient    CAD  -No chest pain  -Fully anticoagulated. Continue statin    CHF  -Probably ischemic cardiomyopathy, AICD in situ  -Not in acute decompensation. Diuretics is temporarily held.   Caution while on gentle hydration    Hypertension  -On low-dose of lisinopril    A. fib  -Amiodarone, Eliquis    History of prostate CA  [facility]    DVT prophylaxis: Eliquis    Full code        Signed:  Michelle Crane MD 12/10/2021

## 2021-12-10 NOTE — CONSULTS
CONSULTATION    REASON FOR CONSULT:  Near syncope    REQUESTING PROVIDER:  Dr. Renetta Marr:    Chief Complaint   Patient presents with    Syncope    Hypotension         HISTORY OF PRESENT ILLNESS:  Gris Goode is a 80y.o. year-old male with past medical history significant for hypertension, coronary artery disease, CHF (AICD in place), atrial fibrillation (on Eliquis), and a history of prostate cancer who presented to ED for further evaluation after a near syncopal episode while driving. Patient states that he was driving and suddenly had an sharp pain at the base of his neck and simultaneous abdominal pain. Patient states that his vision  Became \"fuzzy\" and he was unable to see the road. He was able to move the car to the side of the road. He denies chest pain or palpitations. Significant labs: HS troponin negative x 2, EKG: atrial fibrillation with ventricular escape complexes. Records from hospital admission course thus far reviewed. Telemetry Review: No acute events noted in the past 24 hours.       PAST MEDICAL HISTORY:    Past Medical History:   Diagnosis Date    A-fib ([address]     Aneurysm (HCC)     Burning with urination     Cysto & BRP    CAD (coronary artery disease)     A fib    Cancer (HCC)     prostate    Depression     Dizziness     Dizziness     GERD (gastroesophageal reflux disease)     Headache     Hypercholesterolemia     Hypertension     Hypotension     Pulmonary disease     Pulmonary disease     Sleep apnea     Thyroid disease        PAST SURGICAL HISTORY:   Past Surgical History:   Procedure Laterality Date    CARDIAC SURG PROCEDURE UNLIST      Catheterization, Defibrillator    HX AFIB ABLATION      HX CHOLECYSTECTOMY      HX COLONOSCOPY      2018    HX UROLOGICAL         ALLERGIES:  No Known Allergies    FAMILY HISTORY:    Family History   Problem Relation Age of Onset    Heart Disease Father     Heart Attack Father     Prostate Cancer Brother     Colon Cancer Brother     Cancer Brother        SOCIAL HISTORY:    Tobacco: former smoker  Drugs: no  ETOH: no    HOME MEDICATIONS:    Prior to Admission Medications   Prescriptions Last Dose Informant Patient Reported? Taking?   acetaminophen (TYLENOL) 500 mg tablet   Yes No   Sig: Take  by mouth every six (6) hours as needed for Pain. alfuzosin SR (UROXATRAL) 10 mg SR tablet   Yes No   Sig: Take  by mouth daily. amiodarone (CORDARONE) 200 mg tablet   Yes Yes   Sig: Take 200 mg by mouth daily. apixaban (Eliquis) 5 mg tablet   Yes No   Sig: Take 5 mg by mouth two (2) times a day. aspirin delayed-release 81 mg tablet   Yes No   Sig: Take  by mouth daily. candesartan (ATACAND) 8 mg tablet   Yes No   Sig: Take  by mouth daily. carvediloL (COREG) 6.25 mg tablet   Yes No   Sig: Take  by mouth two (2) times daily (with meals). cholecalciferol (Vitamin D3) (1000 Units /25 mcg) tablet   Yes No   Sig: Take  by mouth daily. docusate sodium (COLACE) 100 mg capsule   Yes No   Sig: Take 100 mg by mouth two (2) times a day. dorzolamide (TRUSOPT) 2 % ophthalmic solution   Yes No   Sig: Administer 2 Drops to both eyes three (3) times daily. dutasteride (AVODART) 0.5 mg capsule   Yes No   Sig: Take 0.5 mg by mouth daily. eplerenone (INSPRA) 25 mg tablet   Yes No   Sig: Take  by mouth daily. finasteride (PROSCAR) 5 mg tablet   Yes No   Sig: Take 5 mg by mouth daily. folic acid/multivit-min/lutein (CENTRUM SILVER PO)   Yes No   Sig: Take  by mouth. furosemide (LASIX) 40 mg tablet   Yes No   Sig: Take  by mouth daily. leuprolide depot (Lupron Depot, 6 Month,) 45 mg injection   Yes No   Si mg by IntraMUSCular route once. lidocaine 5 % topical cream   Yes No   Sig: Apply  to affected area two (2) times daily as needed. lisinopriL (PRINIVIL, ZESTRIL) 5 mg tablet   Yes No   Sig: Take  by mouth daily. megestroL (MEGACE) 40 mg tablet   No No   Sig: Take 1 Tab by mouth daily. megestroL (MEGACE) 40 mg tablet   No No   Sig: Take 1 Tab by mouth two (2) times a day for 90 days. melatonin 1 mg tablet   Yes No   Sig: Take  by mouth.   multivitamin (ONE A DAY) tablet   Yes No   Sig: Take 1 Tab by mouth daily. pravastatin (PRAVACHOL) 40 mg tablet 12/10/2021 at Unknown time  Yes Yes   Sig: Take 40 mg by mouth nightly. sertraline (ZOLOFT) 50 mg tablet   Yes No   Sig: Take  by mouth daily. tamsulosin (FLOMAX) 0.4 mg capsule   Yes No   Sig: Take 0.4 mg by mouth daily. traMADoL (ULTRAM) 50 mg tablet   Yes No   Sig: Take 50 mg by mouth every six (6) hours as needed for Pain. traZODone (DESYREL) 50 mg tablet   Yes No   Sig: Take  by mouth nightly. Facility-Administered Medications: None       REVIEW OF SYSTEMS:  Complete review of systems performed, pertinents noted above, all other systems are negative. Patient Vitals for the past 24 hrs:   Temp Pulse Resp BP SpO2   12/10/21 1318  83 20 119/71 98 %   12/10/21 0746  80 16 105/77 96 %   12/10/21 0158  89  100/60    12/10/21 0152  89  106/67    12/10/21 0149  70  (!) 96/55    12/09/21 2005 97.6 °F (36.4 °C) 66 17 105/60 99 %       PHYSICAL EXAMINATION:    General:  NAD, A&O  HEENT: Normocephalic, PERRL, no drainage  Neck: Supple, Trachea midline, No JVD  RESP: CTA bilaterally. + Symmetrical chest movement. No SOB or distress. On room air  Cardiovascular: RRR no MRG  PVS: No rubor, cyanosis, no edema  ABD:  soft, NT, Normoactive BS  Derm: Warm/Dry/Intact with no lesions  Neuro: A&O PPTS,  No focal deficits  PSYCH: No anxiety or agitation    Recent labs results and imaging reviewed.       Recent Results (from the past 24 hour(s))   EKG, 12 LEAD, INITIAL    Collection Time: 12/09/21  8:04 PM   Result Value Ref Range    Ventricular Rate 68 BPM    Atrial Rate 86 BPM    QRS Duration 90 ms    Q-T Interval 388 ms    QTC Calculation (Bezet) 412 ms    Calculated R Axis 89 degrees    Calculated T Axis 0 degrees    Diagnosis Atrial fibrillation with a competing junctional pacemaker with ventricular   escape complexes  Low voltage QRS  Septal infarct (cited on or before 03-JAN-2020)  Abnormal ECG  When compared with ECG of 03-JAN-2020 14:15,  Sinus rhythm is now with ventricular escape complexes  Questionable change in initial forces of Septal leads  Confirmed by GRZEGORZ MARINELLI, Glynn Champagne ([id number]) on 12/10/2021 9:47:38 AM     CBC WITH AUTOMATED DIFF    Collection Time: 12/09/21  8:24 PM   Result Value Ref Range    WBC 8.2 4.1 - 11.1 K/uL    RBC 3.32 (L) 4.10 - 5.70 M/uL    HGB 10.8 (L) 12.1 - 17.0 g/dL    HCT 35.1 (L) 36.6 - 50.3 %    .7 (H) 80.0 - 99.0 FL    MCH 32.5 26.0 - 34.0 PG    MCHC 30.8 30.0 - 36.5 g/dL    RDW 12.2 11.5 - 14.5 %    PLATELET 245 (L) 319 - 400 K/uL    MPV 11.6 8.9 - 12.9 FL    NRBC 0.0 0.0  WBC    ABSOLUTE NRBC 0.00 0.00 - 0.01 K/uL    NEUTROPHILS 81 (H) 32 - 75 %    LYMPHOCYTES 9 (L) 12 - 49 %    MONOCYTES 8 5 - 13 %    EOSINOPHILS 1 0 - 7 %    BASOPHILS 0 0 - 1 %    IMMATURE GRANULOCYTES 1 (H) 0 - 0.5 %    ABS. NEUTROPHILS 6.6 1.8 - 8.0 K/UL    ABS. LYMPHOCYTES 0.8 0.8 - 3.5 K/UL    ABS. MONOCYTES 0.6 0.0 - 1.0 K/UL    ABS. EOSINOPHILS 0.1 0.0 - 0.4 K/UL    ABS. BASOPHILS 0.0 0.0 - 0.1 K/UL    ABS. IMM. GRANS. 0.0 0.00 - 0.04 K/UL    DF AUTOMATED     METABOLIC PANEL, COMPREHENSIVE    Collection Time: 12/09/21  8:24 PM   Result Value Ref Range    Sodium 143 136 - 145 mmol/L    Potassium 4.5 3.5 - 5.1 mmol/L    Chloride 113 (H) 97 - 108 mmol/L    CO2 27 21 - 32 mmol/L    Anion gap 3 (L) 5 - 15 mmol/L    Glucose 146 (H) 65 - 100 mg/dL    BUN 21 (H) 6 - 20 mg/dL    Creatinine 1.29 0.70 - 1.30 mg/dL    BUN/Creatinine ratio 16 12 - 20      GFR est AA >60 >60 ml/min/1.73m2    GFR est non-AA 52 (L) >60 ml/min/1.73m2    Calcium 8.2 (L) 8.5 - 10.1 mg/dL    Bilirubin, total 0.5 0.2 - 1.0 mg/dL    AST (SGOT) 18 15 - 37 U/L    ALT (SGPT) 20 12 - 78 U/L    Alk.  phosphatase 55 45 - 117 U/L    Protein, total 5.4 (L) 6.4 - 8.2 g/dL    Albumin 3.0 (L) 3.5 - 5.0 g/dL    Globulin 2.4 2.0 - 4.0 g/dL    A-G Ratio 1.3 1.1 - 2.2     TROPONIN-HIGH SENSITIVITY    Collection Time: 12/09/21  8:24 PM   Result Value Ref Range    Troponin-High Sensitivity 15 0 - 76 ng/L   URINALYSIS W/ RFLX MICROSCOPIC    Collection Time: 12/09/21 10:30 PM   Result Value Ref Range    Color Yellow/Straw      Appearance Clear Clear      Specific gravity 1.013 1.003 - 1.030      pH (UA) 5.0 5.0 - 8.0      Protein Negative Negative mg/dL    Glucose Negative Negative mg/dL    Ketone Negative Negative mg/dL    Bilirubin Negative Negative      Blood Large (A) Negative      Urobilinogen 0.1 0.1 - 1.0 EU/dL    Nitrites Negative Negative      Leukocyte Esterase Negative Negative     URINE MICROSCOPIC    Collection Time: 12/09/21 10:30 PM   Result Value Ref Range    WBC 0-4 0 - 4 /hpf    RBC 20-50 0 - 5 /hpf    Bacteria Negative Negative /hpf    Mucus Trace (A) Negative /lpf    Hyaline cast 0-2 0 - 5 /lpf   RETICULOCYTE COUNT    Collection Time: 12/10/21  4:00 AM   Result Value Ref Range    Reticulocyte count 1.6 0.7 - 2.1 %    Absolute Retic Cnt. 0.0501 0.0260 - 1.4192 M/ul   METABOLIC PANEL, BASIC    Collection Time: 12/10/21  4:04 AM   Result Value Ref Range    Sodium 147 (H) 136 - 145 mmol/L    Potassium 4.3 3.5 - 5.1 mmol/L    Chloride 117 (H) 97 - 108 mmol/L    CO2 26 21 - 32 mmol/L    Anion gap 4 (L) 5 - 15 mmol/L    Glucose 115 (H) 65 - 100 mg/dL    BUN 20 6 - 20 mg/dL    Creatinine 1.17 0.70 - 1.30 mg/dL    BUN/Creatinine ratio 17 12 - 20      GFR est AA >60 >60 ml/min/1.73m2    GFR est non-AA 58 (L) >60 ml/min/1.73m2    Calcium 8.5 8.5 - 10.1 mg/dL   CBC W/O DIFF    Collection Time: 12/10/21  4:04 AM   Result Value Ref Range    WBC 7.6 4.1 - 11.1 K/uL    RBC 3.17 (L) 4.10 - 5.70 M/uL    HGB 10.2 (L) 12.1 - 17.0 g/dL    HCT 33.1 (L) 36.6 - 50.3 %    .4 (H) 80.0 - 99.0 FL    MCH 32.2 26.0 - 34.0 PG    MCHC 30.8 30.0 - 36.5 g/dL    RDW 12.3 11.5 - 14.5 % PLATELET 757 (L) 084 - 400 K/uL    MPV 11.7 8.9 - 12.9 FL    NRBC 0.0 0.0  WBC    ABSOLUTE NRBC 0.00 0.00 - 0.01 K/uL   VITAMIN B12    Collection Time: 12/10/21  4:04 AM   Result Value Ref Range    Vitamin B12 159 (L) 193 - 986 pg/mL   FOLATE    Collection Time: 12/10/21  4:04 AM   Result Value Ref Range    Folate 14.1 5.0 - 21.0 ng/mL   FERRITIN    Collection Time: 12/10/21  4:04 AM   Result Value Ref Range    Ferritin 36 26 - 388 ng/mL   IRON PROFILE    Collection Time: 12/10/21  4:04 AM   Result Value Ref Range    Iron 47 35 - 150 ug/dL    TIBC 233 (L) 250 - 450 ug/dL    Iron % saturation 20 20 - 50 %       XR Results (maximum last 3): Results from [place] encounter on 12/09/21    XR CHEST SNGL V    Impression  Mild enlargement of cardiopericardial silhouette with stable  positioning of right ventricular AICD lead. There is obscuration of a portion of  the left hemithorax by the generator device. No evidence of pneumothorax. Central vascular prominence. Nonspecific central peribronchial cuffing. No  evidence of pulmonary edema, air space pneumonia, or pleural effusion. CT Results (maximum last 3): Results from [place] encounter on 12/09/21    CTA HEAD NECK W CONT    Impression  1. Chronic appearing right cerebellar infarct. 2.  Age-appropriate global volume loss and atherosclerotic disease. 3.  No hemodynamically significant stenosis, occlusion, dissection, or  aneurysmal dilatation of the cervical or intracranial vessels. 4.  Nodular thyroid. 5.  Cervical degenerative osteoarthritic changes.         Current Facility-Administered Medications:     [Held by provider] eplerenone (INSPRA) tablet 25 mg, 25 mg, Oral, DAILY, Lake Mccarty MD    [Held by provider] furosemide (LASIX) tablet 40 mg, 40 mg, Oral, DAILY, Lake Mccarty MD    sertraline (ZOLOFT) tablet 100 mg, 100 mg, Oral, QPM, Lake Mccarty MD    docusate sodium (COLACE) capsule 100 mg, 100 mg, Oral, BID, Abigail Hodges MD, 100 mg at 12/10/21 1050    apixaban (ELIQUIS) tablet 5 mg, 5 mg, Oral, BID, Lake Mccarty MD, 5 mg at 12/10/21 1050    finasteride (PROSCAR) tablet 5 mg, 5 mg, Oral, DAILY, Lake Mccarty MD    melatonin tablet 3 mg, 3 mg, Oral, QHS, Lake Mccarty MD    pravastatin (PRAVACHOL) tablet 40 mg, 40 mg, Oral, QHS, Lake Mccarty MD    tamsulosin (FLOMAX) capsule 0.4 mg, 0.4 mg, Oral, DAILY, Lake Mccarty MD, 0.4 mg at 12/10/21 1050    traMADoL (ULTRAM) tablet 50 mg, 50 mg, Oral, Q6H PRN, Lake Mccarty MD    traZODone (DESYREL) tablet 50 mg, 50 mg, Oral, QHS, Lake Mccarty MD    dorzolamide (TRUSOPT) 2 % ophthalmic solution 1 Drop, 1 Drop, Both Eyes, TID, Lake Mccarty MD    amiodarone (CORDARONE) tablet 200 mg, 200 mg, Oral, DAILY, Lake Mccarty MD, 200 mg at 12/10/21 1050    sodium chloride (NS) flush 5-40 mL, 5-40 mL, IntraVENous, Q8H, Lake Mccarty MD, 10 mL at 12/10/21 1128    sodium chloride (NS) flush 5-40 mL, 5-40 mL, IntraVENous, PRN, Lake Mccarty MD    acetaminophen (TYLENOL) tablet 650 mg, 650 mg, Oral, Q6H PRN **OR** acetaminophen (TYLENOL) suppository 650 mg, 650 mg, Rectal, Q6H PRN, Lake Mccarty MD    polyethylene glycol (MIRALAX) packet 17 g, 17 g, Oral, DAILY PRN, Lake Mccarty MD    ondansetron (ZOFRAN ODT) tablet 4 mg, 4 mg, Oral, Q8H PRN **OR** ondansetron (ZOFRAN) injection 4 mg, 4 mg, IntraVENous, Q6H PRN, Lake Mccarty MD    Current Outpatient Medications:     amiodarone (CORDARONE) 200 mg tablet, Take 200 mg by mouth daily. , Disp: , Rfl:     pravastatin (PRAVACHOL) 40 mg tablet, Take 40 mg by mouth nightly., Disp: , Rfl:     megestroL (MEGACE) 40 mg tablet, Take 1 Tab by mouth two (2) times a day for 90 days. , Disp: 180 Tab, Rfl: 3    folic acid/multivit-min/lutein (CENTRUM SILVER PO), Take  by mouth., Disp: , Rfl:     dorzolamide (TRUSOPT) 2 % ophthalmic solution, Administer 2 Drops to both eyes three (3) times daily. , Disp: , Rfl:     lidocaine 5 % topical cream, Apply  to affected area two (2) times daily as needed. , Disp: , Rfl:     sertraline (ZOLOFT) 50 mg tablet, Take  by mouth daily. , Disp: , Rfl:     acetaminophen (TYLENOL) 500 mg tablet, Take  by mouth every six (6) hours as needed for Pain., Disp: , Rfl:     megestroL (MEGACE) 40 mg tablet, Take 1 Tab by mouth daily. , Disp: 60 Tab, Rfl: 3    alfuzosin SR (UROXATRAL) 10 mg SR tablet, Take  by mouth daily. , Disp: , Rfl:     aspirin delayed-release 81 mg tablet, Take  by mouth daily. , Disp: , Rfl:     candesartan (ATACAND) 8 mg tablet, Take  by mouth daily. , Disp: , Rfl:     carvediloL (COREG) 6.25 mg tablet, Take  by mouth two (2) times daily (with meals). , Disp: , Rfl:     multivitamin (ONE A DAY) tablet, Take 1 Tab by mouth daily. , Disp: , Rfl:     docusate sodium (COLACE) 100 mg capsule, Take 100 mg by mouth two (2) times a day., Disp: , Rfl:     dutasteride (AVODART) 0.5 mg capsule, Take 0.5 mg by mouth daily. , Disp: , Rfl:     apixaban (Eliquis) 5 mg tablet, Take 5 mg by mouth two (2) times a day., Disp: , Rfl:     eplerenone (INSPRA) 25 mg tablet, Take  by mouth daily. , Disp: , Rfl:     finasteride (PROSCAR) 5 mg tablet, Take 5 mg by mouth daily. , Disp: , Rfl:     furosemide (LASIX) 40 mg tablet, Take  by mouth daily. , Disp: , Rfl:     lisinopriL (PRINIVIL, ZESTRIL) 5 mg tablet, Take  by mouth daily. , Disp: , Rfl:     leuprolide depot (Lupron Depot, 6 Month,) 45 mg injection, 45 mg by IntraMUSCular route once., Disp: , Rfl:     melatonin 1 mg tablet, Take  by mouth., Disp: , Rfl:     tamsulosin (FLOMAX) 0.4 mg capsule, Take 0.4 mg by mouth daily. , Disp: , Rfl:     traMADoL (ULTRAM) 50 mg tablet, Take 50 mg by mouth every six (6) hours as needed for Pain., Disp: , Rfl:     traZODone (DESYREL) 50 mg tablet, Take  by mouth nightly., Disp: , Rfl:     cholecalciferol (Vitamin D3) (1000 Units /25 mcg) tablet, Take  by mouth daily. , Disp: , Rfl:     Case discussed with collaborating physician Dr. Tiffanie Truong and our impression and recommendations are as follows:     1. Pre-syncope:   - etiology unclear  - Pacemaker interrogation 12/10/21  VVI ICD, device function WNL, no treated episodes. Presenting = VS, 5 AF episodes <= 26 minutes, Battery at 9.9 years. - recommend carotid duplex and echo for further evaluation.  - hold trazadone  - recommend fall precautions. 2. Coronary artery disease:   - patient's primary cardiologist/EP [facility] at [facility]    3. Hypertension:   - blood pressure controlled. 4. Hyperlipidemia:   - continue statin therapy    5. Atrial fibrillation:   - HR controlled  - patient is on amiodarone and Eliquis      Thank you for involving us in the care of this patient. Please do not hesitate to call if additional questions arise. If after hours please call [phone]. Dr. Faria [facility]  [address], [address] [facility]  [phone]

## 2021-12-10 NOTE — ED TRIAGE NOTES
[place] on highway when he started feeling lightheaded and uncomfortable in his stomach. Pt pulled over and used the bathroom at a local facility. Patient stated he began to feel pain in his neck and lower abdomen below his belly button. Pt denies CP or SOB. Pt denies LOC. Pt BP was low on ambulance(74/56); heart rate in 40s on ambulance; NS was administered on ambulance; pt currently takes blood thinners; Pt is alert and oriented x4. Pt is cooperative with care.

## 2021-12-11 ENCOUNTER — APPOINTMENT (OUTPATIENT)
Dept: NON INVASIVE DIAGNOSTICS | Age: 86
End: 2021-12-11
Attending: NURSE PRACTITIONER
Payer: MEDICARE

## 2021-12-11 VITALS
OXYGEN SATURATION: 92 % | RESPIRATION RATE: 18 BRPM | HEIGHT: 67 IN | WEIGHT: 185 LBS | HEART RATE: 73 BPM | TEMPERATURE: 98 F | BODY MASS INDEX: 29.03 KG/M2 | SYSTOLIC BLOOD PRESSURE: 102 MMHG | DIASTOLIC BLOOD PRESSURE: 58 MMHG

## 2021-12-11 LAB
ECHO AO ROOT DIAM: 3 CM
ECHO AV AREA PEAK VELOCITY: 1.43 CM2
ECHO AV AREA VTI: 1.27 CM2
ECHO AV AREA/BSA PEAK VELOCITY: 0.7 CM2/M2
ECHO AV AREA/BSA VTI: 0.6 CM2/M2
ECHO AV MEAN GRADIENT: 5 MMHG
ECHO AV MEAN VELOCITY: 105 CM/S
ECHO AV PEAK GRADIENT: 9 MMHG
ECHO AV PEAK VELOCITY: 153 CM/S
ECHO AV VTI: 37 CM
ECHO EST RA PRESSURE: 8 MMHG
ECHO LA MAJOR AXIS: 5.6 CM
ECHO LA MAJOR AXIS: 5.6 CM
ECHO LV EDV A2C: 150 CM3
ECHO LV ESV A2C: 60.2 CM3
ECHO LV INTERNAL DIMENSION DIASTOLIC: 5.31 CM (ref 4.2–5.9)
ECHO LV INTERNAL DIMENSION SYSTOLIC: 3.92 CM
ECHO LV IVSD: 1.43 CM (ref 0.6–1)
ECHO LV MASS 2D: 310.1 G (ref 88–224)
ECHO LV MASS INDEX 2D: 158.2 G/M2 (ref 49–115)
ECHO LV POSTERIOR WALL DIASTOLIC: 1.31 CM (ref 0.6–1)
ECHO LVOT DIAM: 2 CM
ECHO LVOT PEAK GRADIENT: 2 MMHG
ECHO LVOT PEAK VELOCITY: 69.6 CM/S
ECHO LVOT SV: 47 CM3
ECHO LVOT VTI: 15 CM
ECHO LVOT VTI: 15 CM
ECHO MV E DECELERATION TIME (DT): 140 MS
ECHO MV E VELOCITY: 104 CM/S
ECHO PV MAX VELOCITY: 76.9 CM/S
ECHO PV PEAK INSTANTANEOUS GRADIENT SYSTOLIC: 2 MMHG
ECHO RA AREA 4C: 26.6 CM2
ECHO RIGHT VENTRICULAR SYSTOLIC PRESSURE (RVSP): 32 MMHG
ECHO RV INTERNAL DIMENSION: 3.9 CM
ECHO TV MAX VELOCITY: 243 CM/S
ECHO TV REGURGITANT PEAK GRADIENT: 24 MMHG
LVOT MG: 1 MMHG

## 2021-12-11 PROCEDURE — 93306 TTE W/DOPPLER COMPLETE: CPT

## 2021-12-11 PROCEDURE — 74011250637 HC RX REV CODE- 250/637: Performed by: INTERNAL MEDICINE

## 2021-12-11 PROCEDURE — G0378 HOSPITAL OBSERVATION PER HR: HCPCS

## 2021-12-11 PROCEDURE — 74011000250 HC RX REV CODE- 250: Performed by: INTERNAL MEDICINE

## 2021-12-11 RX ADMIN — Medication 10 ML: at 05:20

## 2021-12-11 RX ADMIN — DOCUSATE SODIUM 100 MG: 100 CAPSULE ORAL at 09:31

## 2021-12-11 RX ADMIN — AMIODARONE HYDROCHLORIDE 200 MG: 200 TABLET ORAL at 09:31

## 2021-12-11 RX ADMIN — FINASTERIDE 5 MG: 5 TABLET, FILM COATED ORAL at 09:31

## 2021-12-11 RX ADMIN — APIXABAN 5 MG: 5 TABLET, FILM COATED ORAL at 09:31

## 2021-12-11 RX ADMIN — TAMSULOSIN HYDROCHLORIDE 0.4 MG: 0.4 CAPSULE ORAL at 09:31

## 2021-12-11 RX ADMIN — DORZOLAMIDE HYDROCHLORIDE 1 DROP: 20 SOLUTION/ DROPS OPHTHALMIC at 09:00

## 2021-12-11 NOTE — DISCHARGE SUMMARY
Physician Discharge Summary     Patient ID:    Nigel Perez  [id number]  84 y.o.  9/6/1930    Admit date: 12/9/2021    Discharge date : 12/11/2021    Chronic Diagnoses:    Problem List as of 12/11/2021 Date Reviewed: 9/10/2020          Codes Class Noted - Resolved    Postural dizziness with presyncope ICD-10-CM: R42, R55  ICD-9-CM: 780.4, 780.2  12/10/2021 - Present        Lower urinary tract symptoms ICD-10-CM: R39.9  ICD-9-CM: 788.99  7/22/2020 - Present        Orchitis and epididymitis ICD-10-CM: N45.3  ICD-9-CM: 604.90  7/22/2020 - Present        Raised prostate specific antigen ICD-10-CM: R97.20  ICD-9-CM: 790.93  7/22/2020 - Present        Slowing of urinary stream ICD-10-CM: R39.198  ICD-9-CM: 788.62  7/22/2020 - Present        Urinary (tract) obstruction ICD-10-CM: N13.9  ICD-9-CM: 599.60  7/22/2020 - Present        Enlarged prostate with urinary obstruction ICD-10-CM: N40.1, N13.8  ICD-9-CM: 600.01, 599.69  7/15/2020 - Present        Bladder neck obstruction ICD-10-CM: N32.0  ICD-9-CM: 596.0  7/15/2020 - Present        Chronic epididymitis ICD-10-CM: N45.1  ICD-9-CM: 604.90  7/15/2020 - Present        Chronic prostatitis ICD-10-CM: N41.1  ICD-9-CM: 601.1  7/15/2020 - Present        Juvenal hematuria ICD-10-CM: R31.0  ICD-9-CM: 599.71  7/15/2020 - Present        Incomplete emptying of bladder ICD-10-CM: R33.9  ICD-9-CM: 788.21  7/15/2020 - Present        Increased frequency of urination ICD-10-CM: R35.0  ICD-9-CM: 788.41  7/15/2020 - Present        Leukocytes in urine ICD-10-CM: R82.998  ICD-9-CM: 791.7  7/15/2020 - Present        Microscopic hematuria ICD-10-CM: R31.29  ICD-9-CM: 599.72  7/15/2020 - Present        Must strain to pass urine ICD-10-CM: R39.16  ICD-9-CM: 788.65  7/15/2020 - Present        Nocturia ICD-10-CM: R35.1  ICD-9-CM: 788.43  7/15/2020 - Present        Aneurysm ([facility] ICD-10-CM: I72.9  ICD-9-CM: 442.9  Unknown - Present          22    Final Diagnoses:   Postural dizziness with presyncope [R42, R55]    Reason for Hospitalization:  Presyncopal event      Hospital Course:   He is a 70-year-old male with a history of hypertension, CAD, heart failure, AICD, atrial fibrillation and prostate cancer, normally followed by the [place] who presented the ED after having a near syncopal episode while driving on the highway. He pulled over to the side and called a family member who subsequently brought him to the ED. Symptoms started about 30 minutes after eating dinner. He was complaining of some lower abdominal and suprapubic discomfort as well but no nausea or vomiting. In the ED blood pressure was reported at 74/56 and a heart rate in the 40s. He received fluids in the ED. Labs showed a hemoglobin of 10.8, unremarkable chemistries. Blood pressure on arrival to the ED was 105/60. Monitored in the hospital without any acute arrhythmias noted. Ambulating to and from the bathroom was without difficulties. Reports back to baseline and wife agrees. Will be discharged home with close follow-up with the [facility]              Discharge Medications:   Current Discharge Medication List      CONTINUE these medications which have NOT CHANGED    Details   amiodarone (CORDARONE) 200 mg tablet Take 200 mg by mouth daily. folic acid/multivit-min/lutein (CENTRUM SILVER PO) Take  by mouth. dorzolamide (TRUSOPT) 2 % ophthalmic solution Administer 2 Drops to both eyes three (3) times daily. sertraline (ZOLOFT) 50 mg tablet Take  by mouth daily. megestroL (MEGACE) 40 mg tablet Take 1 Tab by mouth daily. Qty: 60 Tab, Refills: 3      aspirin delayed-release 81 mg tablet Take  by mouth daily. multivitamin (ONE A DAY) tablet Take 1 Tab by mouth daily. docusate sodium (COLACE) 100 mg capsule Take 100 mg by mouth two (2) times a day. dutasteride (AVODART) 0.5 mg capsule Take 0.5 mg by mouth daily.       apixaban (Eliquis) 5 mg tablet Take 5 mg by mouth two (2) times a day.      finasteride (PROSCAR) 5 mg tablet Take 5 mg by mouth daily. furosemide (LASIX) 40 mg tablet Take  by mouth daily. lisinopriL (PRINIVIL, ZESTRIL) 5 mg tablet Take  by mouth daily. melatonin 1 mg tablet Take  by mouth.      pravastatin (PRAVACHOL) 40 mg tablet Take 40 mg by mouth nightly. tamsulosin (FLOMAX) 0.4 mg capsule Take 0.4 mg by mouth daily. traZODone (DESYREL) 50 mg tablet Take  by mouth nightly. cholecalciferol (Vitamin D3) (1000 Units /25 mcg) tablet Take  by mouth daily. lidocaine 5 % topical cream Apply  to affected area two (2) times daily as needed. acetaminophen (TYLENOL) 500 mg tablet Take  by mouth every six (6) hours as needed for Pain.      leuprolide depot (Lupron Depot, 6 Month,) 45 mg injection 45 mg by IntraMUSCular route once. traMADoL (ULTRAM) 50 mg tablet Take 50 mg by mouth every six (6) hours as needed for Pain. STOP taking these medications       alfuzosin SR (UROXATRAL) 10 mg SR tablet Comments:   Reason for Stopping:         candesartan (ATACAND) 8 mg tablet Comments:   Reason for Stopping:         carvediloL (COREG) 6.25 mg tablet Comments:   Reason for Stopping:         eplerenone (INSPRA) 25 mg tablet Comments:   Reason for Stopping: Follow up Care:    1. Michelle Munoz MD in 1-2 weeks. Please call to set up an appointment shortly after discharge. Diet:  Cardiac Diet    Disposition:  Home. Advanced Directive:   FULL    DNR      Discharge Exam:  Visit Vitals  BP (!) 102/58 (BP 1 Location: Right upper arm, BP Patient Position: Supine)   Pulse 73   Temp 98 °F (36.7 °C)   Resp 18   Ht 5' 7\" (1.702 m)   Wt 83.9 kg (185 lb)   SpO2 92%   BMI 28.98 kg/m²    O2 Flow Rate (L/min): 2 l/min O2 Device: None (Room air)    Temp (24hrs), Av.1 °F (36.7 °C), Min:98 °F (36.7 °C), Max:98.3 °F (36.8 °C)    No intake/output data recorded.     190 0700  In: 595.8 [I.V.:595.8]  Out: 150 [Urine:150]    General:  Alert, cooperative, no distress, appears stated age. Lungs:   Clear to auscultation bilaterally. Chest wall:  No tenderness or deformity. Heart:  Regular rate and rhythm, S1, S2 normal, no murmur, click, rub or gallop. Abdomen:   Soft, non-tender. Bowel sounds normal. No masses,  No organomegaly. Extremities: Extremities normal, atraumatic, no cyanosis or edema. Pulses: 2+ and symmetric all extremities. Skin: Skin color, texture, turgor normal. No rashes or lesions   Neurologic: CNII-XII intact. No gross sensory or motor deficits         CONSULTATIONS: Cardiology    Significant Diagnostic Studies:   12/9/2021: BUN 21 mg/dL (H; Ref range: 6 - 20 mg/dL); Calcium 8.2 mg/dL (L; Ref range: 8.5 - 10.1 mg/dL); CO2 27 mmol/L (Ref range: 21 - 32 mmol/L); Creatinine 1.29 mg/dL (Ref range: 0.70 - 1.30 mg/dL); Glucose 146 mg/dL (H; Ref range: 65 - 100 mg/dL); HCT 35.1 % (L; Ref range: 36.6 - 50.3 %); HGB 10.8 g/dL (L; Ref range: 12.1 - 17.0 g/dL); Potassium 4.5 mmol/L (Ref range: 3.5 - 5.1 mmol/L); Sodium 143 mmol/L (Ref range: 136 - 145 mmol/L)  12/10/2021: BUN 20 mg/dL (Ref range: 6 - 20 mg/dL); Calcium 8.5 mg/dL (Ref range: 8.5 - 10.1 mg/dL); CO2 26 mmol/L (Ref range: 21 - 32 mmol/L); Creatinine 1.17 mg/dL (Ref range: 0.70 - 1.30 mg/dL); Glucose 115 mg/dL (H; Ref range: 65 - 100 mg/dL); HCT 33.1 % (L; Ref range: 36.6 - 50.3 %); HGB 10.2 g/dL (L; Ref range: 12.1 - 17.0 g/dL); Potassium 4.3 mmol/L (Ref range: 3.5 - 5.1 mmol/L);  Sodium 147 mmol/L (H; Ref range: 136 - 145 mmol/L)  Recent Labs     12/10/21  0404 12/09/21 2024   WBC 7.6 8.2   HGB 10.2* 10.8*   HCT 33.1* 35.1*   * 149*     Recent Labs     12/10/21  0404 12/09/21 2024   * 143   K 4.3 4.5   * 113*   CO2 26 27   BUN 20 21*   CREA 1.17 1.29   * 146*   CA 8.5 8.2*     Recent Labs     12/09/21 2024   ALT 20   AP 55   TBILI 0.5   TP 5.4*   ALB 3.0*   GLOB 2.4     No results for input(s): INR, PTP, APTT, INREXT in the last 72 hours. Recent Labs     12/10/21  0404   TIBC 233*   PSAT 20   FERR 36      No results for input(s): PH, PCO2, PO2 in the last 72 hours. No results for input(s): CPK, CKMB in the last 72 hours.     No lab exists for component: TROPONINI  No results found for: [address]    Total Time: 35 minutes    Signed:  Ney Porter MD  12/11/2021  11:50 AM

## 2021-12-11 NOTE — PROGRESS NOTES
Hospitalist Progress Note               Daily Progress Note: 12/11/2021      Subjective: The patient is seen for follow up. He is a 70-year-old male with a history of hypertension, CAD, heart failure, AICD, atrial fibrillation and prostate cancer, normally followed by the [facility]  [address] who presented the ED after having a near syncopal episode while driving on the highway. He pulled over to the side and called a family member who subsequently brought him to the ED. Symptoms started about 30 minutes after eating dinner. He was complaining of some lower abdominal and suprapubic discomfort as well but no nausea or vomiting. In the ED blood pressure was reported at 74/56 and a heart rate in the 40s. He received fluids in the ED. Labs showed a hemoglobin of 10.8, unremarkable chemistries. Blood pressure on arrival to the ED was 105/60. CTA of the head and neck were done which showed a chronic right cerebellar infarct but no large vessel occlusion. Upon reviewing the EMS note, they indicated a twelve-lead EKG showed \"third-degree block\" with a heart rate in the 30s and 40s. Unfortunately, we do not have any documentation of this. There was no evidence for complete heart block here    ---    Patient is negative for orthostatic, urinalysis was clear, troponin was negative x 2, EKG: atrial fibrillation with ventricular escape complexes    Pacemaker interrogation report 12/10/2021: VVI ICD, device function WNL, no treated episodes. Will follow-up on carotid duplex and echo    Patient notes that he has been doing well, been able to ambulate to the bathroom and no dizziness from standing. No signs of presyncope since the last incidence. He would like to go home and work on getting better. HGB low at 10.2. Platelet low at 547,886. Low vitamin B12 at 159, low TIBC at 233. Creatinine improved at 1.17.     Problem List:  Problem List as of 12/11/2021 Date Reviewed: 9/10/2020          Codes Class Noted - Resolved Postural dizziness with presyncope ICD-10-CM: R42, R55  ICD-9-CM: 780.4, 780.2  12/10/2021 - Present        Lower urinary tract symptoms ICD-10-CM: R39.9  ICD-9-CM: 788.99  7/22/2020 - Present        Orchitis and epididymitis ICD-10-CM: N45.3  ICD-9-CM: 604.90  7/22/2020 - Present        Raised prostate specific antigen ICD-10-CM: R97.20  ICD-9-CM: 790.93  7/22/2020 - Present        Slowing of urinary stream ICD-10-CM: R39.198  ICD-9-CM: 788.62  7/22/2020 - Present        Urinary (tract) obstruction ICD-10-CM: N13.9  ICD-9-CM: 599.60  7/22/2020 - Present        Enlarged prostate with urinary obstruction ICD-10-CM: N40.1, N13.8  ICD-9-CM: 600.01, 599.69  7/15/2020 - Present        Bladder neck obstruction ICD-10-CM: N32.0  ICD-9-CM: 596.0  7/15/2020 - Present        Chronic epididymitis ICD-10-CM: N45.1  ICD-9-CM: 604.90  7/15/2020 - Present        Chronic prostatitis ICD-10-CM: N41.1  ICD-9-CM: 601.1  7/15/2020 - Present        Juvenal hematuria ICD-10-CM: R31.0  ICD-9-CM: 599.71  7/15/2020 - Present        Incomplete emptying of bladder ICD-10-CM: R33.9  ICD-9-CM: 788.21  7/15/2020 - Present        Increased frequency of urination ICD-10-CM: R35.0  ICD-9-CM: 788.41  7/15/2020 - Present        Leukocytes in urine ICD-10-CM: R82.998  ICD-9-CM: 791.7  7/15/2020 - Present        Microscopic hematuria ICD-10-CM: R31.29  ICD-9-CM: 599.72  7/15/2020 - Present        Must strain to pass urine ICD-10-CM: R39.16  ICD-9-CM: 788.65  7/15/2020 - Present        Nocturia ICD-10-CM: R35.1  ICD-9-CM: 788.43  7/15/2020 - Present        Aneurysm ([address] ICD-10-CM: I72.9  ICD-9-CM: 442.9  Unknown - Present              Medications reviewed  Current Facility-Administered Medications   Medication Dose Route Frequency    [Held by provider] eplerenone (INSPRA) tablet 25 mg  25 mg Oral DAILY    [Held by provider] furosemide (LASIX) tablet 40 mg  40 mg Oral DAILY    sertraline (ZOLOFT) tablet 100 mg  100 mg Oral QPM    docusate sodium (COLACE) capsule 100 mg  100 mg Oral BID    apixaban (ELIQUIS) tablet 5 mg  5 mg Oral BID    finasteride (PROSCAR) tablet 5 mg  5 mg Oral DAILY    melatonin tablet 3 mg  3 mg Oral QHS    pravastatin (PRAVACHOL) tablet 40 mg  40 mg Oral QHS    tamsulosin (FLOMAX) capsule 0.4 mg  0.4 mg Oral DAILY    traMADoL (ULTRAM) tablet 50 mg  50 mg Oral Q6H PRN    traZODone (DESYREL) tablet 50 mg  50 mg Oral QHS    dorzolamide (TRUSOPT) 2 % ophthalmic solution 1 Drop  1 Drop Both Eyes TID    amiodarone (CORDARONE) tablet 200 mg  200 mg Oral DAILY    sodium chloride (NS) flush 5-40 mL  5-40 mL IntraVENous Q8H    sodium chloride (NS) flush 5-40 mL  5-40 mL IntraVENous PRN    acetaminophen (TYLENOL) tablet 650 mg  650 mg Oral Q6H PRN    Or    acetaminophen (TYLENOL) suppository 650 mg  650 mg Rectal Q6H PRN    polyethylene glycol (MIRALAX) packet 17 g  17 g Oral DAILY PRN    ondansetron (ZOFRAN ODT) tablet 4 mg  4 mg Oral Q8H PRN    Or    ondansetron (ZOFRAN) injection 4 mg  4 mg IntraVENous Q6H PRN       Review of Systems:   A comprehensive review of systems was negative except for that written in the HPI. Objective:   Physical Exam:     Visit Vitals  /69 (BP 1 Location: Right upper arm, BP Patient Position: Sitting)   Pulse 85   Temp 98 °F (36.7 °C)   Resp 18   Ht 5' 7\" (1.702 m)   Wt 185 lb (83.9 kg)   SpO2 94%   BMI 28.98 kg/m²    O2 Flow Rate (L/min): 2 l/min O2 Device: None (Room air)    Temp (24hrs), Av.1 °F (36.7 °C), Min:98 °F (36.7 °C), Max:98.3 °F (36.8 °C)    No intake/output data recorded.  1901 -  0700  In: 595.8 [I.V.:595.8]  Out: 150 [Urine:150]    General:   Awake and alert   Lungs:   Clear to auscultation bilaterally. Chest wall:  No tenderness or deformity. Heart:  Regular rate and rhythm, S1, S2 normal, no murmur, click, rub or gallop. Abdomen:   Soft, non-tender. Bowel sounds normal. No masses,  No organomegaly.    Extremities: Extremities normal, atraumatic, no cyanosis or edema. Pulses: 2+ and symmetric all extremities. Skin: Skin color, texture, turgor normal. No rashes or lesions   Neurologic: CNII-XII intact. No gross focal deficits         Data Review:       Recent Days:  Recent Labs     12/10/21  0404 12/09/21 2024   WBC 7.6 8.2   HGB 10.2* 10.8*   HCT 33.1* 35.1*   * 149*     Recent Labs     12/10/21  0404 12/09/21 2024   * 143   K 4.3 4.5   * 113*   CO2 26 27   * 146*   BUN 20 21*   CREA 1.17 1.29   CA 8.5 8.2*   ALB  --  3.0*   TBILI  --  0.5   ALT  --  20     No results for input(s): PH, PCO2, PO2, HCO3, FIO2 in the last 72 hours.     24 Hour Results:  Recent Results (from the past 24 hour(s))   ECHO ADULT COMPLETE    Collection Time: 12/11/21  9:02 AM   Result Value Ref Range    Aortic Valve Systolic Mean Gradient 9.52 mmHg    AoV VTI 37.00 cm    Aortic valve mean velocity 105.00 cm/s    Aortic Valve Systolic Peak Velocity 957.63 cm/s    AoV PG 9.00 mmHg    Aortic Valve Area by Continuity of VTI 1.27 cm2    Aortic Valve Area by Continuity of Peak Velocity 1.43 cm2    Ao Root D 3.00 cm    IVSd 1.43 (A) 0.6 - 1.0 cm    LVIDd 5.31 4.2 - 5.9 cm    LVIDs 3.92 cm    LVOT d 2.00 cm    Left Ventricular Outflow Tract Mean Gradient 1.00 mmHg    LVOT VTI 15.00 cm    LVOT VTI 15.00 cm    LVOT Peak Velocity 69.60 cm/s    LVOT Peak Gradient 2.00 mmHg    LVPWd 1.31 (A) 0.6 - 1.0 cm    LV ED Vol A2C 150.00 cm3    LV ES Vol A2C 60.20 cm3    LVOT SV 47.0 cm3    Left Atrium Major Axis 5.60 cm    Left Atrium Major Axis 5.60 cm    Mitral Valve E Wave Deceleration Time 140.00 ms    MV E Fabian 104.00 cm/s    Pulmonic Valve Max Velocity 76.90 cm/s    Pulmonic Valve Systolic Peak Instantaneous Gradient 2.00 mmHg    Est. RA Pressure 8.00 mmHg    RVIDd 3.90 cm    RVSP 32.00 mmHg    Tricuspid Valve Max Velocity 243.00 cm/s    Triscuspid Valve Regurgitation Peak Gradient 24.00 mmHg    Right Atrial Area 4C 26.60 cm2    LV Mass .1 88 - 224 g    LV Mass AL Index 158.2 49 - 115 g/m2    BLANCA/BSA Pk Fabian 0.7 cm2/m2    BLANCA/BSA VTI 0.6 cm2/m2       CTA HEAD NECK W CONT   Final Result      1. Chronic appearing right cerebellar infarct. 2.  Age-appropriate global volume loss and atherosclerotic disease. 3.  No hemodynamically significant stenosis, occlusion, dissection, or   aneurysmal dilatation of the cervical or intracranial vessels. 4.  Nodular thyroid. 5.  Cervical degenerative osteoarthritic changes. XR CHEST SNGL V   Final Result   Mild enlargement of cardiopericardial silhouette with stable   positioning of right ventricular AICD lead. There is obscuration of a portion of   the left hemithorax by the generator device. No evidence of pneumothorax. Central vascular prominence. Nonspecific central peribronchial cuffing. No   evidence of pulmonary edema, air space pneumonia, or pleural effusion. Assessment:  Presyncope, etiology unclear although concerning for bradyarrhythmia versus complete heart block with pacemaker malfunction as reportedly seen by EMS although unable to verify with hard copy of tracing    Chronic systolic heart failure, EF unknown    Coronary artery disease, details unknown    Probable chronic kidney disease stage III    Paroxysmal atrial fibrillation, on amiodarone and Eliquis    History of prostate cancer    Likely chronic anemia      Plan:  Cardiology following  Continue amiodarone and eliquis for atrial fibrillation  Follow-up on carotid duplex and echo      Care Plan discussed with: Patient/Family    Disposition: Likely discharge if no evidence for arrhythmia or heart block    Total time spent with patient: 30 minutes.       Diana Loomis

## 2021-12-11 NOTE — PROGRESS NOTES
VSS. IV removed. Pt denies any complaints at this time and agrees w/ plan to discharge to home. Counseled pt on discharge paperwork and need for f/u w/ PCP. Provided reasons to return to the hospital. Pt will go home w/ girlfriend.

## 2021-12-11 NOTE — ED NOTES
.. TRANSFER - OUT REPORT:    Verbal report given to Erik Chatman RN on Julio Leigh  being transferred to [facility] for routine progression of care       Report consisted of patients Situation, Background, Assessment and   Recommendations(SBAR). Information from the following report(s) SBAR was reviewed with the receiving nurse. Lines:   Peripheral IV 12/09/21 Left Antecubital (Active)        Opportunity for questions and clarification was provided.       Patient transported with:   [employer]

## 2021-12-11 NOTE — DISCHARGE INSTRUCTIONS
Patient Education        Fainting: Care Instructions  Your Care Instructions     When you faint, or pass out, you lose consciousness for a short time. A brief drop in blood flow to the brain often causes it. When you fall or lie down, more blood flows to your brain and you regain consciousness. Emotional stress, pain, or overheating--especially if you have been standing--can make you faint. In these cases, fainting is usually not serious. But fainting can be a sign of a more serious problem. Your doctor may want you to have more tests to rule out other causes. The treatment you need depends on the reason why you fainted. The doctor has checked you carefully, but problems can develop later. If you notice any problems or new symptoms, get medical treatment right away. Follow-up care is a key part of your treatment and safety. Be sure to make and go to all appointments, and call your doctor if you are having problems. It's also a good idea to know your test results and keep a list of the medicines you take. How can you care for yourself at home? · Drink plenty of fluids to prevent dehydration. If you have kidney, heart, or liver disease and have to limit fluids, talk with your doctor before you increase your fluid intake. When should you call for help? Call 911 anytime you think you may need emergency care. For example, call if:    · You have symptoms of a heart problem. These may include:  ? Chest pain or pressure. ? Severe trouble breathing. ? A fast or irregular heartbeat. ? Lightheadedness or sudden weakness. ? Coughing up pink, foamy mucus. ? Passing out. After you call 911, the  may tell you to chew 1 adult-strength or 2 to 4 low-dose aspirin. Wait for an ambulance. Do not try to drive yourself.     · You have symptoms of a stroke. These may include:  ? Sudden numbness, tingling, weakness, or loss of movement in your face, arm, or leg, especially on only one side of your body.   ? Sudden vision changes. ? Sudden trouble speaking. ? Sudden confusion or trouble understanding simple statements. ? Sudden problems with walking or balance. ? A sudden, severe headache that is different from past headaches.     · You passed out (lost consciousness) again. Watch closely for changes in your health, and be sure to contact your doctor if:    · You do not get better as expected. Where can you learn more? Go to http://www.gray.com/  Enter A848 in the search box to learn more about \"Fainting: Care Instructions. \"  Current as of: July 1, 2021               Content Version: 13.0  © 7749-3865 [employer]. Care instructions adapted under license by [employer] (which disclaims liability or warranty for this information). If you have questions about a medical condition or this instruction, always ask your healthcare professional. [address] any warranty or liability for your use of this information.

## 2021-12-11 NOTE — PROGRESS NOTES
CARDIOLOGY PROGRESS NOTE - NP    Patient seen and examined. This is a patient who is followed for near syncope. States that he was driving yesterday and felt an overwhelming lightheaded sensation with abdominal pain. No nausea. No loss of bowel or bladder control. No chest pain or dyspnea. This morning he remains at baseline with no further episodes. He is seen after completing ADLs in the restroom and again reports feeling well. Orthostatics negative yesterday. No other complaints reported. Telemetry reviewed, there were no events noted in the past 24 hours. Remains in rate controlled AF. Pertinent review of systems items noted above, all other systems are negative. Current medications reviewed. Physical Examination  Vital signs are stable. Blood pressure 102/58, Pulse 73  No apparent distress. Heart is irregularly irregular. Normal S1, S2, no murmurs are appreciated. Lungs are clear bilaterally. Abdomen is soft, nontender, normal bowel sounds. Extremities have no edema. Normal, steady gait. Neuro exam is non-focal.    Labs reviewed:     Case discussed with Dr. Jonas Pollack and our impression and recommendations are as follows:     Case discussed with collaborating physician Dr. Jonas Pollack and our impression and recommendations are as follows:      1. Pre-syncope:   - Unknown etiology though device interrogation with no arrhythmia. Possibly orthostatic.  - Pacemaker interrogation 12/10/21  VVI ICD, device function WNL, no treated episodes. Presenting = VS, 5 AF episodes <= 26 minutes, Battery at 9.9 years. Echocardiogram performed this morning and awaiting read. He is otherwise considered stable for discharge as follows with the [place]. Would continue to hold trazodone. Orthostatics negative. - hold trazadone       2. Coronary artery disease:  - No chest pain. Troponin negative x one. EKG non-ischemic   - patient's primary cardiologist/EP is at [facility]     3.  Hypertension:   - blood pressure controlled. He may need to decrease eplerenone to 12.5mg moving forward. 4.  Cardiomyopathy: s/p single chamber ICD. Echo pending but appears compensated. On limited GDMT likely due to hypotension. Followed by [place]. Consider decreasing MRA.     5. Atrial fibrillation:   - HR controlled  - patient is on amiodarone and Eliquis.          Thank you for involving us in the care of this patient. Please do not hesitate to call if additional questions arise. If after hours please call [phone]. Please do not hesitate to call me or Dr. Ericka Dickens if additional questions arise.

## 2022-03-18 PROBLEM — R82.998 LEUKOCYTES IN URINE: Status: ACTIVE | Noted: 2020-07-15

## 2022-03-18 PROBLEM — N13.9 URINARY (TRACT) OBSTRUCTION: Status: ACTIVE | Noted: 2020-07-22

## 2022-03-18 PROBLEM — N41.1 CHRONIC PROSTATITIS: Status: ACTIVE | Noted: 2020-07-15

## 2022-03-18 PROBLEM — R31.0 FRANK HEMATURIA: Status: ACTIVE | Noted: 2020-07-15

## 2022-03-18 PROBLEM — R97.20 RAISED PROSTATE SPECIFIC ANTIGEN: Status: ACTIVE | Noted: 2020-07-22

## 2022-03-19 PROBLEM — N40.1 ENLARGED PROSTATE WITH URINARY OBSTRUCTION: Status: ACTIVE | Noted: 2020-07-15

## 2022-03-19 PROBLEM — N45.3 ORCHITIS AND EPIDIDYMITIS: Status: ACTIVE | Noted: 2020-07-22

## 2022-03-19 PROBLEM — R31.29 MICROSCOPIC HEMATURIA: Status: ACTIVE | Noted: 2020-07-15

## 2022-03-19 PROBLEM — R39.16 MUST STRAIN TO PASS URINE: Status: ACTIVE | Noted: 2020-07-15

## 2022-03-19 PROBLEM — R33.9 INCOMPLETE EMPTYING OF BLADDER: Status: ACTIVE | Noted: 2020-07-15

## 2022-03-19 PROBLEM — R35.0 INCREASED FREQUENCY OF URINATION: Status: ACTIVE | Noted: 2020-07-15

## 2022-03-19 PROBLEM — R35.1 NOCTURIA: Status: ACTIVE | Noted: 2020-07-15

## 2022-03-19 PROBLEM — N45.1 CHRONIC EPIDIDYMITIS: Status: ACTIVE | Noted: 2020-07-15

## 2022-03-19 PROBLEM — R55 POSTURAL DIZZINESS WITH PRESYNCOPE: Status: ACTIVE | Noted: 2021-12-10

## 2022-03-19 PROBLEM — R42 POSTURAL DIZZINESS WITH PRESYNCOPE: Status: ACTIVE | Noted: 2021-12-10

## 2022-03-19 PROBLEM — R39.198 SLOWING OF URINARY STREAM: Status: ACTIVE | Noted: 2020-07-22

## 2022-03-19 PROBLEM — N13.8 ENLARGED PROSTATE WITH URINARY OBSTRUCTION: Status: ACTIVE | Noted: 2020-07-15

## 2022-03-20 PROBLEM — R39.9 LOWER URINARY TRACT SYMPTOMS: Status: ACTIVE | Noted: 2020-07-22

## 2022-03-20 PROBLEM — N32.0 BLADDER NECK OBSTRUCTION: Status: ACTIVE | Noted: 2020-07-15

## 2023-05-18 RX ORDER — TRAMADOL HYDROCHLORIDE 50 MG/1
50 TABLET ORAL EVERY 6 HOURS PRN
COMMUNITY

## 2023-05-18 RX ORDER — PRAVASTATIN SODIUM 40 MG
40 TABLET ORAL NIGHTLY
COMMUNITY

## 2023-05-18 RX ORDER — DUTASTERIDE 0.5 MG/1
0.5 CAPSULE, LIQUID FILLED ORAL DAILY
COMMUNITY

## 2023-05-18 RX ORDER — MEGESTROL ACETATE 40 MG/1
40 TABLET ORAL 2 TIMES DAILY
COMMUNITY
Start: 2020-07-21

## 2023-05-18 RX ORDER — TAMSULOSIN HYDROCHLORIDE 0.4 MG/1
0.4 CAPSULE ORAL DAILY
COMMUNITY

## 2023-05-18 RX ORDER — ASPIRIN 81 MG/1
TABLET ORAL DAILY
COMMUNITY

## 2023-05-18 RX ORDER — LIDOCAINE 5 G/100G
CREAM RECTAL; TOPICAL 2 TIMES DAILY PRN
COMMUNITY

## 2023-05-18 RX ORDER — ACETAMINOPHEN 500 MG
TABLET ORAL EVERY 6 HOURS PRN
COMMUNITY

## 2023-05-18 RX ORDER — TRAZODONE HYDROCHLORIDE 50 MG/1
TABLET ORAL
COMMUNITY

## 2023-05-18 RX ORDER — PSEUDOEPHEDRINE HCL 30 MG
100 TABLET ORAL 2 TIMES DAILY
COMMUNITY

## 2023-05-18 RX ORDER — FINASTERIDE 5 MG/1
5 TABLET, FILM COATED ORAL DAILY
COMMUNITY

## 2023-05-18 RX ORDER — LISINOPRIL 5 MG/1
TABLET ORAL DAILY
COMMUNITY

## 2023-05-18 RX ORDER — FUROSEMIDE 40 MG/1
TABLET ORAL DAILY
COMMUNITY

## 2023-05-18 RX ORDER — LEUPROLIDE ACETATE 45 MG
45 KIT INTRAMUSCULAR ONCE
COMMUNITY

## 2023-05-18 RX ORDER — DORZOLAMIDE HCL 20 MG/ML
2 SOLUTION/ DROPS OPHTHALMIC 3 TIMES DAILY
COMMUNITY

## 2023-05-18 RX ORDER — AMIODARONE HYDROCHLORIDE 200 MG/1
200 TABLET ORAL DAILY
COMMUNITY

## 2023-05-18 RX ORDER — UREA 10 %
LOTION (ML) TOPICAL
COMMUNITY